# Patient Record
Sex: MALE | Race: ASIAN | NOT HISPANIC OR LATINO
[De-identification: names, ages, dates, MRNs, and addresses within clinical notes are randomized per-mention and may not be internally consistent; named-entity substitution may affect disease eponyms.]

---

## 2018-04-11 ENCOUNTER — APPOINTMENT (OUTPATIENT)
Dept: UROLOGY | Facility: CLINIC | Age: 68
End: 2018-04-11
Payer: MEDICARE

## 2018-04-11 VITALS
HEIGHT: 64 IN | DIASTOLIC BLOOD PRESSURE: 79 MMHG | BODY MASS INDEX: 21.34 KG/M2 | WEIGHT: 125 LBS | SYSTOLIC BLOOD PRESSURE: 124 MMHG | TEMPERATURE: 98.2 F | HEART RATE: 67 BPM | RESPIRATION RATE: 16 BRPM

## 2018-04-11 DIAGNOSIS — F41.9 ANXIETY DISORDER, UNSPECIFIED: ICD-10-CM

## 2018-04-11 DIAGNOSIS — N52.9 MALE ERECTILE DYSFUNCTION, UNSPECIFIED: ICD-10-CM

## 2018-04-11 DIAGNOSIS — C61 MALIGNANT NEOPLASM OF PROSTATE: ICD-10-CM

## 2018-04-11 DIAGNOSIS — N28.1 CYST OF KIDNEY, ACQUIRED: ICD-10-CM

## 2018-04-11 DIAGNOSIS — R31.29 OTHER MICROSCOPIC HEMATURIA: ICD-10-CM

## 2018-04-11 PROCEDURE — 99204 OFFICE O/P NEW MOD 45 MIN: CPT

## 2018-04-12 LAB
APPEARANCE: CLEAR
BACTERIA: NEGATIVE
BILIRUBIN URINE: NEGATIVE
BLOOD URINE: NEGATIVE
CALCIUM OXALATE CRYSTALS: ABNORMAL
COLOR: YELLOW
GLUCOSE QUALITATIVE U: NEGATIVE MG/DL
KETONES URINE: NEGATIVE
LEUKOCYTE ESTERASE URINE: NEGATIVE
MICROSCOPIC-UA: NORMAL
NITRITE URINE: NEGATIVE
PH URINE: 7.5
PROTEIN URINE: NEGATIVE MG/DL
RED BLOOD CELLS URINE: 2 /HPF
SPECIFIC GRAVITY URINE: 1.02
SQUAMOUS EPITHELIAL CELLS: 0 /HPF
UROBILINOGEN URINE: NEGATIVE MG/DL
WHITE BLOOD CELLS URINE: 0 /HPF

## 2018-04-15 LAB
BACTERIA UR CULT: NORMAL
CORE LAB FLUID CYTOLOGY: NORMAL

## 2018-12-04 ENCOUNTER — EMERGENCY (EMERGENCY)
Facility: HOSPITAL | Age: 68
LOS: 1 days | Discharge: HOME | End: 2018-12-04
Attending: EMERGENCY MEDICINE | Admitting: EMERGENCY MEDICINE
Payer: MEDICARE

## 2018-12-04 VITALS
OXYGEN SATURATION: 99 % | TEMPERATURE: 98 F | HEART RATE: 66 BPM | RESPIRATION RATE: 18 BRPM | SYSTOLIC BLOOD PRESSURE: 142 MMHG | DIASTOLIC BLOOD PRESSURE: 80 MMHG

## 2018-12-04 PROCEDURE — 73080 X-RAY EXAM OF ELBOW: CPT | Mod: 26,RT

## 2018-12-04 PROCEDURE — 73562 X-RAY EXAM OF KNEE 3: CPT | Mod: 26,LT

## 2018-12-04 PROCEDURE — 99284 EMERGENCY DEPT VISIT MOD MDM: CPT

## 2018-12-04 PROCEDURE — 70486 CT MAXILLOFACIAL W/O DYE: CPT | Mod: 26

## 2018-12-04 PROCEDURE — 72125 CT NECK SPINE W/O DYE: CPT | Mod: 26

## 2018-12-04 PROCEDURE — 71046 X-RAY EXAM CHEST 2 VIEWS: CPT | Mod: 26

## 2018-12-04 PROCEDURE — 70450 CT HEAD/BRAIN W/O DYE: CPT | Mod: 26

## 2018-12-04 RX ORDER — ACETAMINOPHEN 500 MG
975 TABLET ORAL ONCE
Qty: 0 | Refills: 0 | Status: COMPLETED | OUTPATIENT
Start: 2018-12-04 | End: 2018-12-04

## 2018-12-04 NOTE — ED PROVIDER NOTE - PHYSICAL EXAMINATION
On exam, HDS, NAD, AAOx3, PERRLA, CN 2-12 intact, 5/5 strength, SILT, head with mild TTP at R maxilla, no ecchymoses, cspine NTTP in midline, + FROM, rest of spine NTTP in midline, lungs CTAB, heart sounds normal, chest wall NTTP, abd benign, pelvis stable, ext without deformity, edema, or TTP, + FROM, 2+ pulses throughout, + abrasion to L hand and L knee

## 2018-12-04 NOTE — ED PROVIDER NOTE - OBJECTIVE STATEMENT
Cabot: 68M not on AC who tripped in the parking lot and fell onto his R face, R elbow, R chest, and L knee.  + LOC.  No N/V.  No numbness or weakness.  Last tdap less than 10 years ago.

## 2018-12-04 NOTE — ED ADULT TRIAGE NOTE - CHIEF COMPLAINT QUOTE
Patient tripped and fell in parking lot and hit his right cheek bone, right ribcage, right arm/elbow and left knee. Pt states it took his breath away and he almost felt like he lost consciousness for 20 seconds.

## 2018-12-04 NOTE — ED PROVIDER NOTE - MEDICAL DECISION MAKING DETAILS
Cabot: 68M not on AC who tripped in the parking lot and fell onto his R face, R elbow, R chest, and L knee.  + LOC.  No N/V.  No numbness or weakness.  Last tdap less than 10 years ago.  On exam, HDS, NAD, AAOx3, PERRLA, CN 2-12 intact, 5/5 strength, SILT, head with mild TTP at R maxilla, no ecchymoses, cspine NTTP in midline, + FROM, rest of spine NTTP in midline, lungs CTAB, heart sounds normal, chest wall NTTP, abd benign, pelvis stable, ext without deformity, edema, or TTP, + FROM, 2+ pulses throughout, + abrasion to L hand and L knee.  Will check CT head, CT cspine, CT max/face, CXR, XR R elbow, XR L knee, control pain.

## 2018-12-04 NOTE — ED PROVIDER NOTE - PMH
Depression    HTN (hypertension)    No pertinent past medical history    Prostate cancer  1997- prostatectomy

## 2018-12-04 NOTE — ED PROVIDER NOTE - NSFOLLOWUPINSTRUCTIONS_ED_ALL_ED_FT
You have been seen after a fall.  No fractures or bleeds were seen on your imaging.  This is likely contusions.  Please take tylenol and advil as needed for pain.  Come back to the ED if you develop vomiting, loss of consciousness, or other concerns.

## 2023-04-07 ENCOUNTER — OFFICE (OUTPATIENT)
Dept: URBAN - METROPOLITAN AREA CLINIC 90 | Facility: CLINIC | Age: 73
Setting detail: OPHTHALMOLOGY
End: 2023-04-07
Payer: MEDICARE

## 2023-04-07 DIAGNOSIS — H02.831: ICD-10-CM

## 2023-04-07 DIAGNOSIS — H25.13: ICD-10-CM

## 2023-04-07 DIAGNOSIS — H35.361: ICD-10-CM

## 2023-04-07 DIAGNOSIS — H35.372: ICD-10-CM

## 2023-04-07 DIAGNOSIS — H11.153: ICD-10-CM

## 2023-04-07 DIAGNOSIS — H02.834: ICD-10-CM

## 2023-04-07 PROCEDURE — 92014 COMPRE OPH EXAM EST PT 1/>: CPT | Performed by: OPHTHALMOLOGY

## 2023-04-07 PROCEDURE — 92134 CPTRZ OPH DX IMG PST SGM RTA: CPT | Performed by: OPHTHALMOLOGY

## 2023-04-07 ASSESSMENT — REFRACTION_CURRENTRX
OD_CYLINDER: -2.00
OD_CYLINDER: -1.75
OD_VPRISM_DIRECTION: PROGS
OS_OVR_VA: 20/
OD_VPRISM_DIRECTION: PROGS
OS_AXIS: 010
OD_ADD: +2.25
OS_VPRISM_DIRECTION: PROGS
OS_CYLINDER: -1.50
OS_OVR_VA: 20/
OS_SPHERE: -2.00
OS_ADD: +2.25
OS_VPRISM_DIRECTION: PROGS
OD_CYLINDER: +1.75
OS_CYLINDER: -1.25
OS_ADD: +2.75
OD_SPHERE: -3.25
OD_SPHERE: -0.75
OS_SPHERE: -2.00
OS_CYLINDER: +1.00
OD_AXIS: 172
OD_OVR_VA: 20/
OD_OVR_VA: 20/
OD_SPHERE: -1.50
OD_ADD: +2.75
OS_VPRISM_DIRECTION: PROGS
OS_ADD: +2.50
OD_VPRISM_DIRECTION: PROGS
OD_ADD: +2.50
OS_AXIS: 096
OS_OVR_VA: 20/
OD_AXIS: 081
OS_SPHERE: -3.00
OS_AXIS: 103
OD_OVR_VA: 20/
OD_AXIS: 070

## 2023-04-07 ASSESSMENT — REFRACTION_MANIFEST
OD_AXIS: 085
OD_VA1: 20/40
OD_AXIS: 175
OD_VA1: 20/30
OS_ADD: +3.50
OS_VA2: 20/30
OS_AXIS: 010
OD_ADD: +3.00
OS_VA1: 20/30+1
OS_AXIS: 100
OD_CYLINDER: +2.00
OS_SPHERE: -3.00
OS_ADD: +3.00
OS_VA1: 20/25-
OD_SPHERE: -3.50
OS_ADD: +3.00
OD_ADD: +3.50
OD_CYLINDER: -1.75
OS_VA1: 20/25-
OD_ADD: +3.00
OS_CYLINDER: +0.75
OD_VA1: 20/25
OS_CYLINDER: -1.25
OS_SPHERE: -2.25
OS_SPHERE: -2.25
OD_SPHERE: -1.75
OD_AXIS: 090
OU_VA: 20/20
OD_SPHERE: -1.75
OS_AXIS: 095
OS_CYLINDER: -1.25
OD_VA2: 20/30
OS_VA2: 20/25(J1)
OD_CYLINDER: -1.75
OD_VA2: 20/25(J1)

## 2023-04-07 ASSESSMENT — SPHEQUIV_DERIVED
OD_SPHEQUIV: -2.625
OS_SPHEQUIV: -2.625
OD_SPHEQUIV: -2.5
OD_SPHEQUIV: -3.125
OS_SPHEQUIV: -2.875
OD_SPHEQUIV: -2.625

## 2023-04-07 ASSESSMENT — TONOMETRY
OS_IOP_MMHG: 18
OD_IOP_MMHG: 18

## 2023-04-07 ASSESSMENT — KERATOMETRY
OD_K2POWER_DIOPTERS: 46.25
OS_AXISANGLE_DEGREES: 090
METHOD_AUTO_MANUAL: AUTO
OD_AXISANGLE_DEGREES: 172
OS_K1POWER_DIOPTERS: 45.50
OD_K1POWER_DIOPTERS: 5.75
OS_K2POWER_DIOPTERS: 45.50

## 2023-04-07 ASSESSMENT — AXIALLENGTH_DERIVED
OD_AL: 34.9
OD_AL: 34.48
OS_AL: 23.9838
OD_AL: 34.38
OS_AL: 23.9838
OS_AL: 23.8836
OS_AL: 23.9838
OD_AL: 34.48

## 2023-04-07 ASSESSMENT — VISUAL ACUITY
OD_BCVA: 20/30+1
OS_BCVA: 20/40-2

## 2023-04-07 ASSESSMENT — LID POSITION - DERMATOCHALASIS
OD_DERMATOCHALASIS: RUL 2+
OS_DERMATOCHALASIS: LUL 2+

## 2023-04-07 ASSESSMENT — REFRACTION_AUTOREFRACTION
OS_AXIS: 089
OD_SPHERE: -2.00
OS_SPHERE: -2.25
OD_AXIS: 089
OD_CYLINDER: -2.25
OS_CYLINDER: -1.25

## 2023-04-27 ENCOUNTER — OFFICE (OUTPATIENT)
Dept: URBAN - METROPOLITAN AREA CLINIC 90 | Facility: CLINIC | Age: 73
Setting detail: OPHTHALMOLOGY
End: 2023-04-27
Payer: MEDICARE

## 2023-04-27 DIAGNOSIS — T15.12XA: ICD-10-CM

## 2023-04-27 PROCEDURE — 65205 REMOVE FOREIGN BODY FROM EYE: CPT | Performed by: OPHTHALMOLOGY

## 2023-04-27 ASSESSMENT — REFRACTION_CURRENTRX
OD_ADD: +2.25
OS_CYLINDER: -1.50
OD_SPHERE: -1.50
OS_OVR_VA: 20/
OD_SPHERE: -3.25
OD_CYLINDER: -1.75
OS_ADD: +2.75
OS_VPRISM_DIRECTION: PROGS
OS_ADD: +2.50
OD_VPRISM_DIRECTION: PROGS
OD_ADD: +2.50
OD_VPRISM_DIRECTION: PROGS
OS_SPHERE: -3.00
OS_AXIS: 010
OS_VPRISM_DIRECTION: PROGS
OD_VPRISM_DIRECTION: PROGS
OD_AXIS: 081
OS_AXIS: 103
OD_OVR_VA: 20/
OD_ADD: +2.75
OD_CYLINDER: -2.00
OS_OVR_VA: 20/
OD_SPHERE: -0.75
OD_AXIS: 070
OD_CYLINDER: +1.75
OS_CYLINDER: -1.25
OS_ADD: +2.25
OS_SPHERE: -2.00
OD_OVR_VA: 20/
OS_SPHERE: -2.00
OS_VPRISM_DIRECTION: PROGS
OD_OVR_VA: 20/
OS_CYLINDER: +1.00
OS_AXIS: 096
OS_OVR_VA: 20/
OD_AXIS: 172

## 2023-04-27 ASSESSMENT — KERATOMETRY
OD_K2POWER_DIOPTERS: 46.25
OS_K1POWER_DIOPTERS: 45.50
METHOD_AUTO_MANUAL: AUTO
OS_AXISANGLE_DEGREES: 090
OD_AXISANGLE_DEGREES: 172
OD_K1POWER_DIOPTERS: 5.75
OS_K2POWER_DIOPTERS: 45.50

## 2023-04-27 ASSESSMENT — REFRACTION_MANIFEST
OS_AXIS: 095
OD_AXIS: 085
OD_ADD: +3.00
OD_CYLINDER: +2.00
OS_ADD: +3.00
OD_VA2: 20/25(J1)
OS_CYLINDER: -1.25
OD_VA1: 20/40
OD_VA1: 20/25
OD_VA1: 20/30
OS_SPHERE: -2.25
OD_AXIS: 090
OS_ADD: +3.50
OD_CYLINDER: -1.75
OD_SPHERE: -1.75
OD_SPHERE: -3.50
OS_VA2: 20/25(J1)
OS_VA1: 20/30+1
OD_CYLINDER: -1.75
OS_SPHERE: -2.25
OD_VA2: 20/30
OS_VA1: 20/25-
OU_VA: 20/20
OD_AXIS: 175
OD_ADD: +3.00
OS_VA2: 20/30
OS_ADD: +3.00
OS_VA1: 20/25-
OS_CYLINDER: -1.25
OS_AXIS: 100
OD_ADD: +3.50
OS_CYLINDER: +0.75
OS_AXIS: 010
OD_SPHERE: -1.75
OS_SPHERE: -3.00

## 2023-04-27 ASSESSMENT — VISUAL ACUITY
OD_BCVA: 20/30
OS_BCVA: 20/40-2

## 2023-04-27 ASSESSMENT — REFRACTION_AUTOREFRACTION
OD_SPHERE: -2.00
OS_CYLINDER: -1.25
OD_CYLINDER: -2.25
OD_AXIS: 089
OS_AXIS: 089
OS_SPHERE: -2.25

## 2023-04-27 ASSESSMENT — SPHEQUIV_DERIVED
OS_SPHEQUIV: -2.875
OS_SPHEQUIV: -2.875
OD_SPHEQUIV: -2.625
OS_SPHEQUIV: -2.875
OS_SPHEQUIV: -2.625
OD_SPHEQUIV: -2.5
OD_SPHEQUIV: -3.125
OD_SPHEQUIV: -2.625

## 2023-04-27 ASSESSMENT — AXIALLENGTH_DERIVED
OD_AL: 34.48
OD_AL: 34.48
OS_AL: 23.9838
OD_AL: 34.38
OS_AL: 23.9838
OS_AL: 23.8836
OD_AL: 34.9
OS_AL: 23.9838

## 2023-04-28 PROBLEM — T15.12XA FOREIGN BODY, CONJUNCTIVAL; LEFT EYE INITIAL ENCOUNTER: Status: ACTIVE | Noted: 2023-04-27

## 2023-04-29 ENCOUNTER — INPATIENT (INPATIENT)
Facility: HOSPITAL | Age: 73
LOS: 1 days | Discharge: HOME | End: 2023-05-01
Attending: INTERNAL MEDICINE | Admitting: INTERNAL MEDICINE
Payer: MEDICARE

## 2023-04-29 VITALS
OXYGEN SATURATION: 100 % | RESPIRATION RATE: 18 BRPM | HEART RATE: 78 BPM | SYSTOLIC BLOOD PRESSURE: 152 MMHG | DIASTOLIC BLOOD PRESSURE: 81 MMHG | TEMPERATURE: 98 F

## 2023-04-29 DIAGNOSIS — E87.1 HYPO-OSMOLALITY AND HYPONATREMIA: ICD-10-CM

## 2023-04-29 DIAGNOSIS — Z29.9 ENCOUNTER FOR PROPHYLACTIC MEASURES, UNSPECIFIED: ICD-10-CM

## 2023-04-29 DIAGNOSIS — R07.9 CHEST PAIN, UNSPECIFIED: ICD-10-CM

## 2023-04-29 DIAGNOSIS — I20.9 ANGINA PECTORIS, UNSPECIFIED: ICD-10-CM

## 2023-04-29 DIAGNOSIS — F32.89 OTHER SPECIFIED DEPRESSIVE EPISODES: ICD-10-CM

## 2023-04-29 LAB
ALBUMIN SERPL ELPH-MCNC: 4.3 G/DL — SIGNIFICANT CHANGE UP (ref 3.3–5)
ALP SERPL-CCNC: 67 U/L — SIGNIFICANT CHANGE UP (ref 40–120)
ALT FLD-CCNC: 17 U/L — SIGNIFICANT CHANGE UP (ref 4–41)
ANION GAP SERPL CALC-SCNC: 13 MMOL/L — SIGNIFICANT CHANGE UP (ref 7–14)
AST SERPL-CCNC: 17 U/L — SIGNIFICANT CHANGE UP (ref 4–40)
BASOPHILS # BLD AUTO: 0.05 K/UL — SIGNIFICANT CHANGE UP (ref 0–0.2)
BASOPHILS NFR BLD AUTO: 1.1 % — SIGNIFICANT CHANGE UP (ref 0–2)
BILIRUB SERPL-MCNC: 0.3 MG/DL — SIGNIFICANT CHANGE UP (ref 0.2–1.2)
BUN SERPL-MCNC: 12 MG/DL — SIGNIFICANT CHANGE UP (ref 7–23)
CALCIUM SERPL-MCNC: 8.8 MG/DL — SIGNIFICANT CHANGE UP (ref 8.4–10.5)
CHLORIDE SERPL-SCNC: 90 MMOL/L — LOW (ref 98–107)
CO2 SERPL-SCNC: 22 MMOL/L — SIGNIFICANT CHANGE UP (ref 22–31)
CREAT SERPL-MCNC: 0.81 MG/DL — SIGNIFICANT CHANGE UP (ref 0.5–1.3)
EGFR: 93 ML/MIN/1.73M2 — SIGNIFICANT CHANGE UP
EOSINOPHIL # BLD AUTO: 0.08 K/UL — SIGNIFICANT CHANGE UP (ref 0–0.5)
EOSINOPHIL NFR BLD AUTO: 1.7 % — SIGNIFICANT CHANGE UP (ref 0–6)
FLUAV AG NPH QL: SIGNIFICANT CHANGE UP
FLUBV AG NPH QL: SIGNIFICANT CHANGE UP
GLUCOSE BLDC GLUCOMTR-MCNC: 106 MG/DL — HIGH (ref 70–99)
GLUCOSE SERPL-MCNC: 105 MG/DL — HIGH (ref 70–99)
HCT VFR BLD CALC: 40.4 % — SIGNIFICANT CHANGE UP (ref 39–50)
HGB BLD-MCNC: 14 G/DL — SIGNIFICANT CHANGE UP (ref 13–17)
IANC: 2.98 K/UL — SIGNIFICANT CHANGE UP (ref 1.8–7.4)
IMM GRANULOCYTES NFR BLD AUTO: 0.2 % — SIGNIFICANT CHANGE UP (ref 0–0.9)
LYMPHOCYTES # BLD AUTO: 0.98 K/UL — LOW (ref 1–3.3)
LYMPHOCYTES # BLD AUTO: 21.4 % — SIGNIFICANT CHANGE UP (ref 13–44)
MCHC RBC-ENTMCNC: 30.7 PG — SIGNIFICANT CHANGE UP (ref 27–34)
MCHC RBC-ENTMCNC: 34.7 GM/DL — SIGNIFICANT CHANGE UP (ref 32–36)
MCV RBC AUTO: 88.6 FL — SIGNIFICANT CHANGE UP (ref 80–100)
MONOCYTES # BLD AUTO: 0.48 K/UL — SIGNIFICANT CHANGE UP (ref 0–0.9)
MONOCYTES NFR BLD AUTO: 10.5 % — SIGNIFICANT CHANGE UP (ref 2–14)
NEUTROPHILS # BLD AUTO: 2.98 K/UL — SIGNIFICANT CHANGE UP (ref 1.8–7.4)
NEUTROPHILS NFR BLD AUTO: 65.1 % — SIGNIFICANT CHANGE UP (ref 43–77)
NRBC # BLD: 0 /100 WBCS — SIGNIFICANT CHANGE UP (ref 0–0)
NRBC # FLD: 0 K/UL — SIGNIFICANT CHANGE UP (ref 0–0)
NT-PROBNP SERPL-SCNC: 29 PG/ML — SIGNIFICANT CHANGE UP
PLATELET # BLD AUTO: 207 K/UL — SIGNIFICANT CHANGE UP (ref 150–400)
POTASSIUM SERPL-MCNC: 4.3 MMOL/L — SIGNIFICANT CHANGE UP (ref 3.5–5.3)
POTASSIUM SERPL-SCNC: 4.3 MMOL/L — SIGNIFICANT CHANGE UP (ref 3.5–5.3)
PROT SERPL-MCNC: 6.3 G/DL — SIGNIFICANT CHANGE UP (ref 6–8.3)
RBC # BLD: 4.56 M/UL — SIGNIFICANT CHANGE UP (ref 4.2–5.8)
RBC # FLD: 11.2 % — SIGNIFICANT CHANGE UP (ref 10.3–14.5)
RSV RNA NPH QL NAA+NON-PROBE: SIGNIFICANT CHANGE UP
SARS-COV-2 RNA SPEC QL NAA+PROBE: SIGNIFICANT CHANGE UP
SODIUM SERPL-SCNC: 125 MMOL/L — LOW (ref 135–145)
TROPONIN T, HIGH SENSITIVITY RESULT: <6 NG/L — SIGNIFICANT CHANGE UP
TROPONIN T, HIGH SENSITIVITY RESULT: <6 NG/L — SIGNIFICANT CHANGE UP
WBC # BLD: 4.58 K/UL — SIGNIFICANT CHANGE UP (ref 3.8–10.5)
WBC # FLD AUTO: 4.58 K/UL — SIGNIFICANT CHANGE UP (ref 3.8–10.5)

## 2023-04-29 PROCEDURE — 99285 EMERGENCY DEPT VISIT HI MDM: CPT

## 2023-04-29 PROCEDURE — 99223 1ST HOSP IP/OBS HIGH 75: CPT

## 2023-04-29 PROCEDURE — 71045 X-RAY EXAM CHEST 1 VIEW: CPT | Mod: 26

## 2023-04-29 RX ORDER — POLYETHYLENE GLYCOL 3350 17 G/17G
17 POWDER, FOR SOLUTION ORAL DAILY
Refills: 0 | Status: DISCONTINUED | OUTPATIENT
Start: 2023-04-29 | End: 2023-05-01

## 2023-04-29 RX ORDER — CITALOPRAM 10 MG/1
10 TABLET, FILM COATED ORAL DAILY
Refills: 0 | Status: DISCONTINUED | OUTPATIENT
Start: 2023-04-29 | End: 2023-04-29

## 2023-04-29 RX ORDER — ONDANSETRON 8 MG/1
4 TABLET, FILM COATED ORAL EVERY 8 HOURS
Refills: 0 | Status: DISCONTINUED | OUTPATIENT
Start: 2023-04-29 | End: 2023-05-01

## 2023-04-29 RX ORDER — LANOLIN ALCOHOL/MO/W.PET/CERES
3 CREAM (GRAM) TOPICAL AT BEDTIME
Refills: 0 | Status: DISCONTINUED | OUTPATIENT
Start: 2023-04-29 | End: 2023-05-01

## 2023-04-29 RX ORDER — ASPIRIN/CALCIUM CARB/MAGNESIUM 324 MG
324 TABLET ORAL ONCE
Refills: 0 | Status: COMPLETED | OUTPATIENT
Start: 2023-04-29 | End: 2023-04-29

## 2023-04-29 RX ORDER — ENOXAPARIN SODIUM 100 MG/ML
40 INJECTION SUBCUTANEOUS EVERY 24 HOURS
Refills: 0 | Status: DISCONTINUED | OUTPATIENT
Start: 2023-04-29 | End: 2023-05-01

## 2023-04-29 RX ORDER — MIRTAZAPINE 45 MG/1
1 TABLET, ORALLY DISINTEGRATING ORAL
Refills: 0 | DISCHARGE

## 2023-04-29 RX ORDER — MIRTAZAPINE 45 MG/1
7.5 TABLET, ORALLY DISINTEGRATING ORAL AT BEDTIME
Refills: 0 | Status: DISCONTINUED | OUTPATIENT
Start: 2023-04-29 | End: 2023-05-01

## 2023-04-29 RX ORDER — ACETAMINOPHEN 500 MG
650 TABLET ORAL EVERY 6 HOURS
Refills: 0 | Status: DISCONTINUED | OUTPATIENT
Start: 2023-04-29 | End: 2023-05-01

## 2023-04-29 RX ORDER — POLYETHYLENE GLYCOL 3350 17 G/17G
17 POWDER, FOR SOLUTION ORAL
Refills: 0 | DISCHARGE

## 2023-04-29 RX ADMIN — Medication 324 MILLIGRAM(S): at 17:47

## 2023-04-29 NOTE — H&P ADULT - TIME BILLING
Reviewed admission imaging reports, and admission labs prior to the encounter with  the patient   Reviewed Triage and ED course/ documentation   Performed full physical exam and ROS  Reviewed outpatient records on Allsctripts as above  Obtained list of home medications and performed home medications reconciliation on EMR  Discussed prognosis, treatment and work/up with the patient   Ordered or reviewed new admission medications and placed or reviewed all hospitalization admission orders  Managed (continued, held or adjusted doses) of home medications   Ordered  or reviewed orders of  new imaging and new lab w/up  Requested new consultations including::: Cardiology

## 2023-04-29 NOTE — ED PROVIDER NOTE - PROGRESS NOTE DETAILS
kusum VILLATORO PGY-1: Signout received.  72-year-old male history of hypertension hyperlipidemia have not seen cardiologist in 10 years complaining of chest pain when swimming, patiently actively with chest pain.  Trope below 6.  Some T wave inversions on EKG.  Telemetry doc paged twice, most recent 7:30 PM

## 2023-04-29 NOTE — PATIENT PROFILE ADULT - FALL HARM RISK - HARM RISK INTERVENTIONS

## 2023-04-29 NOTE — H&P ADULT - NSHPPHYSICALEXAM_GEN_ALL_CORE
Vital Signs Last 24 Hrs  T(C): 36.7 (29 Apr 2023 20:56), Max: 36.8 (29 Apr 2023 16:41)  T(F): 98 (29 Apr 2023 20:56), Max: 98.2 (29 Apr 2023 16:41)  HR: 58 (29 Apr 2023 20:56) (58 - 78)  BP: 137/72 (29 Apr 2023 20:56) (137/72 - 152/81)  BP(mean): --  RR: 20 (29 Apr 2023 20:56) (18 - 20)  SpO2: 100% (29 Apr 2023 20:56) (100% - 100%)    Parameters below as of 29 Apr 2023 20:56  Patient On (Oxygen Delivery Method): room air

## 2023-04-29 NOTE — ED PROVIDER NOTE - OBJECTIVE STATEMENT
Jessi VILLATORO: 72-year-old male, history of depression, hyperlipidemia, presents with a chief complaint of sudden onset substernal nonradiating pressure-like chest pain currently 7 out of 10 that started today while at rest, patient reports he swam today and did not develop any discomfort or shortness of breath thereafter, however yesterday when he sweeps when he developed the same discomfort.  No back pain, no fever, no nausea no vomiting no abdominal pain, no new lower extremity swelling or edema, patient reports he does not have a cardiologist, last had cardiac testing years ago.  Non-smoker.  Patient reports he recently had dental work but is not having any fever.  No meds prior to ED arrival.  Pain did not start today after eating.

## 2023-04-29 NOTE — ED PROVIDER NOTE - PHYSICAL EXAMINATION
Jessi VILLATORO:  VITALS: Initial triage and subsequent vitals have been reviewed by me.  GEN APPEARANCE: Alert, cooperative. Non-toxic appearing. Well appearing. NAD.  HEAD: Atraumatic, normocephalic   EYES: PERRLa, EOMI, vision grossly intact.   EARS: Gross hearing intact.   NOSE: No nasal discharge, no external evidence of epistaxis.   NECK: Supple  CV: RRR, S1S2, no c/r/m/g. No cyanosis. Extremities warm, well perfused. Cap refill <2 seconds. No bruits.   LUNGS: CTAB. No wheezing. No rales. No rhonchi. No diminished breath sounds.   ABDOMEN: Soft, NTND. No guarding or rebound. No masses.   MSK/EXT: Spine appears normal, no spine point tenderness. No CVA ttp. Normal muscular development. Pelvis stable. No obvious joint or bony deformity, no peripheral edema.   NEURO: Alert, follows commands. Weight bearing normal. Speech normal. Sensation and motor normal x4 extremities.   SKIN: Normal color for race, warm, dry and intact. No evidence of rash.  PSYCH: Normal mood and affect.

## 2023-04-29 NOTE — ED ADULT NURSE NOTE - CHIEF COMPLAINT QUOTE
Pt with co chest pain since 12 noon with no co sob. pt co headache x 2 days. Pt denies fever, chills or cough.

## 2023-04-29 NOTE — ED PROVIDER NOTE - CLINICAL SUMMARY MEDICAL DECISION MAKING FREE TEXT BOX
Jessi VILLATORO: Exam vital signs stable nontoxic-appearing physical exam as above, DDx concern for possible unstable angina/ACS, less concern for dissection at this time there is no back pain patient is well-appearing hemodynamically stable, can move everything and for everything, less concern for PE as no lower extremity swelling no cough, no prior history of DVT or PE, EKG with possible new T wave inversions in V3 and V4 compared to prior, plan to check basic labs EKG cardiac enzymes chest x-ray, will give aspirin, anticipate admission to telemetry doc for further evaluation.

## 2023-04-29 NOTE — H&P ADULT - HISTORY OF PRESENT ILLNESS
73yo male, non-smoker, history of depression, hyperlipidemia; c/o sudden onset substernal nonradiating pressure-like chest pain, 7 out of 10, that started today while at rest. Reports he swam today and did not develop any discomfort or shortness of breath thereafter, however yesterday when he was sweeping developed the same discomfort.  Otherwise no back pain, fever, nausea, vomiting, abdominal pain, new lower extremity swelling or edema. States that does not have a cardiologist, and the last cardiac testing was "years" ago. Recently had dental work but not reporting fever. Did not take medications today.  71yo male, non-smoker, history of depression, hyperlipidemia; c/o sudden onset substernal nonradiating pressure-like chest pain, 7 out of 10, that started today while at rest. Reports he swam today and did not develop any discomfort or shortness of breath thereafter, however yesterday when he was also swimming he did develop the same chest discomfort. Otherwise no back pain, fever, nausea, vomiting, abdominal pain, new lower extremity swelling or edema. States that does not have a cardiologist, and the last cardiac testing was "years" ago. Recently had dental work but not reporting fever. Did not take medications today.

## 2023-04-29 NOTE — ED ADULT NURSE NOTE - OBJECTIVE STATEMENT
Received patient in room 14 c/o chest pain started today, patient denies SOB, fever. Patient is on cardiac monitor sinus rhythm w/O2 sat 100% on a room air. Patient is A&OX4, airway patent, breathing unlabored and even, radial pulses palpable. Labs obtained, 20G IV placed on right arm, medication given as ordered. Side rails up and safety maintained. Fall precaution in place. Call bells within reach.

## 2023-04-29 NOTE — H&P ADULT - PROBLEM SELECTOR PLAN 2
Sodium 125; No pain or nausea; Possible side-effect of SSRI:  Exam not c/w fluid overload, Pro-BNP wnl   -TSH  -Osmolality urine and serum ordered   -CXR: clear lungs   -Hold Celexa   -Monitor sodium daily  -Hold all iv fluids

## 2023-04-29 NOTE — ED ADULT NURSE NOTE - NSHOSCREENINGQ1_ED_ALL_ED
Problem: Discharge Planning:  Goal: Ability to perform activities of daily living will improve  Description  Ability to perform activities of daily living will improve  12/12/2019 0254 by Verna Miller RN  Outcome: Met This Shift     Problem: Discharge Planning:  Goal: Participates in care planning  Description  Participates in care planning  12/12/2019 0254 by Verna Miller RN  Outcome: Met This Shift     Problem: Risk for Impaired Skin Integrity  Goal: Tissue integrity - skin and mucous membranes  Description  Structural intactness and normal physiological function of skin and  mucous membranes.   12/12/2019 0254 by Verna Miller RN  Outcome: Met This Shift     Problem: Pain:  Goal: Pain level will decrease  Description  Pain level will decrease  12/12/2019 0254 by Verna Miller RN  Outcome: Met This Shift     Problem: Pain:  Goal: Control of acute pain  Description  Control of acute pain  12/12/2019 0254 by Verna Miller RN  Outcome: Met This Shift     Problem: Pain:  Goal: Control of chronic pain  Description  Control of chronic pain  12/12/2019 0254 by Verna Miller RN  Outcome: Met This Shift     Problem: Breathing Pattern - Ineffective:  Goal: Ability to achieve and maintain a regular respiratory rate will improve  Description  Ability to achieve and maintain a regular respiratory rate will improve  12/12/2019 0254 by Verna Miller RN  Outcome: Met This Shift     Problem: Gas Exchange - Impaired:  Goal: Levels of oxygenation will improve  Description  Levels of oxygenation will improve  12/12/2019 0254 by Verna Miller RN  Outcome: Met This Shift  Note:   Pt is tolerating being weaned from O2 well No

## 2023-04-29 NOTE — H&P ADULT - PROBLEM SELECTOR PLAN 1
Exertional and non-exertional self-resolving non-reproducible CP;   MARGARITA score =2, 8% risk   HEART score =5, moderate   -Telemetry   -EKG: biphasic Tw in anterior leads   -2D echo ordered  -Formal ischemic w/up deferred to cardiology, Dr. Lyles, who will take the care over in AM   -TSH, Lipid panel, a1c

## 2023-04-29 NOTE — ED PROVIDER NOTE - NSICDXPASTMEDICALHX_GEN_ALL_CORE_FT
PAST MEDICAL HISTORY:  Depression     HTN (hypertension)     Prostate cancer 1997- prostatectomy

## 2023-04-29 NOTE — H&P ADULT - NSHPLABSRESULTS_GEN_ALL_CORE
.    -Personally interpreted EKG:      -Personally interpreted CXR: clear lungs, no pneumothorax, no pleural effusions      -Personally interpreted labs below:                        14.0   4.58  )-----------( 207      ( 29 Apr 2023 17:45 )             40.4   04-29    125<L>  |  90<L>  |  12  ----------------------------<  105<H>  4.3   |  22  |  0.81    Ca    8.8      29 Apr 2023 17:45    TPro  6.3  /  Alb  4.3  /  TBili  0.3  /  DBili  x   /  AST  17  /  ALT  17  /  AlkPhos  67  04-29      SARS-CoV-2 Result: Giuseppete (04-29-23 @ 17:46)  Influenza A Result: NotDete (04-29-23 @ 17:46)  Influenza B Result: Giuseppetec (04-29-23 @ 17:46)  Resp Syn Virus Result: NotDetec (04-29-23 @ 17:46) .    -Personally interpreted EKG: nsr, 67bpm, qtc 429, biphasic Tw in V2-V4, no acute ST changes, no pacs or pvcs       -Personally interpreted CXR: clear lungs, no pneumothorax, no pleural effusions      -Personally interpreted labs below:                        14.0   4.58  )-----------( 207      ( 29 Apr 2023 17:45 )             40.4   04-29    125<L>  |  90<L>  |  12  ----------------------------<  105<H>  4.3   |  22  |  0.81    Ca    8.8      29 Apr 2023 17:45    TPro  6.3  /  Alb  4.3  /  TBili  0.3  /  DBili  x   /  AST  17  /  ALT  17  /  AlkPhos  67  04-29      SARS-CoV-2 Result: NotDetec (04-29-23 @ 17:46)  Influenza A Result: NotDetec (04-29-23 @ 17:46)  Influenza B Result: NotDetec (04-29-23 @ 17:46)  Resp Syn Virus Result: NotDetec (04-29-23 @ 17:46)

## 2023-04-30 ENCOUNTER — TRANSCRIPTION ENCOUNTER (OUTPATIENT)
Age: 73
End: 2023-04-30

## 2023-04-30 LAB
A1C WITH ESTIMATED AVERAGE GLUCOSE RESULT: 5.5 % — SIGNIFICANT CHANGE UP (ref 4–5.6)
BUN SERPL-MCNC: 9 MG/DL — SIGNIFICANT CHANGE UP (ref 7–23)
CALCIUM SERPL-MCNC: 8.7 MG/DL — SIGNIFICANT CHANGE UP (ref 8.4–10.5)
CHLORIDE UR-SCNC: 31 MMOL/L — SIGNIFICANT CHANGE UP
CHOLEST SERPL-MCNC: 213 MG/DL — HIGH
CO2 SERPL-SCNC: 23 MMOL/L — SIGNIFICANT CHANGE UP (ref 22–31)
ESTIMATED AVERAGE GLUCOSE: 111 — SIGNIFICANT CHANGE UP
HCV AB S/CO SERPL IA: 0.1 S/CO — SIGNIFICANT CHANGE UP (ref 0–0.99)
HCV AB SERPL-IMP: SIGNIFICANT CHANGE UP
HDLC SERPL-MCNC: 92 MG/DL — SIGNIFICANT CHANGE UP
LIPID PNL WITH DIRECT LDL SERPL: 108 MG/DL — HIGH
MAGNESIUM SERPL-MCNC: 2.1 MG/DL — SIGNIFICANT CHANGE UP (ref 1.6–2.6)
NON HDL CHOLESTEROL: 121 MG/DL — SIGNIFICANT CHANGE UP
OSMOLALITY SERPL: 262 MOSM/KG — LOW (ref 275–295)
OSMOLALITY UR: 466 MOSM/KG — SIGNIFICANT CHANGE UP (ref 50–1200)
PHOSPHATE SERPL-MCNC: 4.9 MG/DL — HIGH (ref 2.5–4.5)
POTASSIUM UR-SCNC: 36.9 MMOL/L — SIGNIFICANT CHANGE UP
SODIUM UR-SCNC: 42 MMOL/L — SIGNIFICANT CHANGE UP
TRIGL SERPL-MCNC: 63 MG/DL — SIGNIFICANT CHANGE UP
TSH SERPL-MCNC: 3.54 UIU/ML — SIGNIFICANT CHANGE UP (ref 0.27–4.2)

## 2023-04-30 RX ORDER — ATORVASTATIN CALCIUM 80 MG/1
20 TABLET, FILM COATED ORAL AT BEDTIME
Refills: 0 | Status: DISCONTINUED | OUTPATIENT
Start: 2023-04-30 | End: 2023-05-01

## 2023-04-30 RX ORDER — SODIUM CHLORIDE 9 MG/ML
1 INJECTION INTRAMUSCULAR; INTRAVENOUS; SUBCUTANEOUS
Refills: 0 | Status: DISCONTINUED | OUTPATIENT
Start: 2023-04-30 | End: 2023-05-01

## 2023-04-30 RX ADMIN — SODIUM CHLORIDE 1 GRAM(S): 9 INJECTION INTRAMUSCULAR; INTRAVENOUS; SUBCUTANEOUS at 17:26

## 2023-04-30 RX ADMIN — POLYETHYLENE GLYCOL 3350 17 GRAM(S): 17 POWDER, FOR SOLUTION ORAL at 11:18

## 2023-04-30 RX ADMIN — MIRTAZAPINE 7.5 MILLIGRAM(S): 45 TABLET, ORALLY DISINTEGRATING ORAL at 21:37

## 2023-04-30 RX ADMIN — ATORVASTATIN CALCIUM 20 MILLIGRAM(S): 80 TABLET, FILM COATED ORAL at 21:37

## 2023-04-30 RX ADMIN — SODIUM CHLORIDE 1 GRAM(S): 9 INJECTION INTRAMUSCULAR; INTRAVENOUS; SUBCUTANEOUS at 11:16

## 2023-04-30 NOTE — CONSULT NOTE ADULT - ASSESSMENT
EKG SR no ischemic changes     Assessment and plan     1) CP : EKG ok trop -enrico , will get echo and stress    2) SOB: Euvolemic on exam , will get echo     3) DVT PPX lovenox 
73yo male, non-smoker, history of depression, hyperlipidemia a/w hyponatremia and angina;       Hyponatremia Euvolemic  Sodium 125  Please get TSH and cortisol level.   Etiology is secondary to SIADH and or Celexa  CXR: clear lungs   On Sodium chloride tabletes  for now  Fluid restrict to one liter.   Hold Celexa   Get BMP now and in am.     HTN  Optimal  Continue to monitor.

## 2023-04-30 NOTE — CONSULT NOTE ADULT - SUBJECTIVE AND OBJECTIVE BOX
[facility]   MD JOVANNI BRASWELL MD KRISTINE SOLTANPOUR, DO ANGELA WONG, PA        TEL:  OFFICE: [phone]  From 5pm-7am answering service [phone]    --- INITIAL RENAL CONSULT NOTE ---date of service 04-30-23 @ 22:15    HPI:  71yo male, non-smoker, history of depression, hyperlipidemia; c/o sudden onset substernal nonradiating pressure-like chest pain, 7 out of 10, that started today while at rest. Reports he swam today and did not develop any discomfort or shortness of breath thereafter, however yesterday when he was also swimming he did develop the same chest discomfort. Otherwise no back pain, fever, nausea, vomiting, abdominal pain, new lower extremity swelling or edema. States that does not have a cardiologist, and the last cardiac testing was "years" ago. Recently had dental work but not reporting fever. Nephrology consulted for hyponatremia.         Allergies:  No Known Drug Allergies  iv contrast (Hives)      PAST MEDICAL & SURGICAL HISTORY:  HTN (hypertension)  Depression  Prostate cancer  1997- prostatectomy  S/P prostatectomy  1997        Home Medications:  citalopram 10 mg oral tablet: 1 orally once a day (29 Apr 2023 23:17)  MiraLax oral powder for reconstitution: 17 orally once a day (29 Apr 2023 23:17)  mirtazapine 7.5 mg oral tablet: 1 orally once a day (at bedtime) (29 Apr 2023 23:16)    Home Medications Reviewed    Hospital Medications:   MEDICATIONS  (STANDING):  atorvastatin 20 milliGRAM(s) Oral at bedtime  enoxaparin Injectable 40 milliGRAM(s) SubCutaneous every 24 hours  mirtazapine 7.5 milliGRAM(s) Oral at bedtime  polyethylene glycol 3350 17 Gram(s) Oral daily  sodium chloride 1 Gram(s) Oral two times a day      SOCIAL HISTORY:  Denies ETOh, Smoking,     FAMILY HISTORY:  FH: lung cancer (Mother)        REVIEW OF SYSTEMS:  CONSTITUTIONAL: No weakness, fevers or chills  EYES/ENT: No visual changes;  No vertigo or throat pain   NECK: No pain or stiffness  RESPIRATORY: No cough, wheezing, hemoptysis; No shortness of breath  CARDIOVASCULAR: No chest pain or palpitations.  GASTROINTESTINAL: No abdominal or epigastric pain. No nausea, vomiting, or hematemesis; No diarrhea or constipation. No melena or hematochezia.  GENITOURINARY: No dysuria, frequency, foamy urine, urinary urgency, incontinence or hematuria  NEUROLOGICAL: No numbness or weakness  SKIN: No itching, burning, rashes, or lesions   VASCULAR: No bilateral lower extremity edema.   All other review of systems is negative unless indicated above.    VITALS:  T(F): 98 (04-30-23 @ 22:03), Max: 98.2 (04-29-23 @ 22:40)  HR: 62 (04-30-23 @ 22:03)  BP: 138/81 (04-30-23 @ 22:03)  RR: 17 (04-30-23 @ 22:03)  SpO2: 98% (04-30-23 @ 22:03)  Wt(kg): --    04-30 @ 07:01  -  04-30 @ 22:15  --------------------------------------------------------  IN: 0 mL / OUT: 150 mL / NET: -150 mL      Height (cm): 162.6 (04-29 @ 22:40)  Weight (kg): 54.9 (04-29 @ 22:40)  BMI (kg/m2): 20.8 (04-29 @ 22:40)  BSA (m2): 1.58 (04-29 @ 22:40)    PHYSICAL EXAM:  General: NAD  HEENT: anicteric sclera, oropharynx clear, MMM  Neck: No JVD  Respiratory: CTAB, no wheezes, rales or rhonchi  Cardiovascular: S1, S2, RRR  Gastrointestinal: BS+, soft, NT/ND  Extremities: No cyanosis or clubbing. No peripheral edema  Neurological: A/O x 3, no focal deficits  Psychiatric: Normal mood, normal affect  : No CVA tenderness. No tran.   Skin: No rashes      LABS:  04-29    125<L>  |  90<L>  |  12  ----------------------------<  105<H>  4.3   |  22  |  0.81    Ca    8.8      29 Apr 2023 17:45    TPro  6.3  /  Alb  4.3  /  TBili  0.3  /  DBili      /  AST  17  /  ALT  17  /  AlkPhos  67  04-29    Creatinine Trend: 0.81 <--                        14.0   4.58  )-----------( 207      ( 29 Apr 2023 17:45 )             40.4     Urine Studies:    Osmolality, Random Urine: 466 mosm/kg (04-30 @ 06:00)  Sodium, Random Urine: 42 mmol/L (04-30 @ 06:00)  Potassium, Random Urine: 36.9 mmol/L (04-30 @ 06:00)  Chloride, Random Urine: 31 mmol/L (04-30 @ 06:00)      RADIOLOGY & ADDITIONAL STUDIES:    ACC: [id number] EXAM:  XR CHEST PORTABLE URGENT 1V   ORDERED BY: JUAN GUTIERREZ     PROCEDURE DATE:  04/29/2023          INTERPRETATION:  EXAMINATION: XR CHEST URGENT    CLINICAL INDICATION: Chest Pain    TECHNIQUE: Single frontal, portable view of the chest was obtained.    COMPARISON: Chest x-ray 12/4/2018    FINDINGS:  The heart is normal in size.  The lungs are clear.  There is no pneumothorax or pleural effusion.    IMPRESSION:  Clear lungs.    --- End of Report ---          CODI JAIN MD; Resident Radiologist  This document has been electronically signed.  CHRISTA WEBB MD; Attending Radiologist  This document has been electronically signed. Apr 30 2023  8:54AM            
Stanislav Lyles MD  Interventional Cardiology / Endovascular Specialist  [place] Office : [phone] [place] [address] [place] [address]  Tel:   [place] Office : [phone] [facility] [place] [address]  Tel: [phone]    H&P:  73yo male, non-smoker, history of depression, hyperlipidemia; c/o sudden onset substernal nonradiating pressure-like chest pain, 7 out of 10, that started today while at rest. Reports he swam today and did not develop any discomfort or shortness of breath thereafter, however yesterday when he was also swimming he did develop the same chest discomfort. Otherwise no back pain, fever, nausea, vomiting, abdominal pain, new lower extremity swelling or edema. States that does not have a cardiologist, and the last cardiac testing was "years" ago. Recently had dental work but not reporting fever. Did not take medications today.      PAST MEDICAL & SURGICAL HISTORY:  HTN (hypertension)      Depression      Prostate cancer  1997- prostatectomy      S/P prostatectomy  1997          SOCIAL HISTORY: Substance Use (street drugs): ( x ) never used  (  ) other:    FAMILY HISTORY:  FH: lung cancer (Mother)        REVIEW OF SYSTEMS:  CONSTITUTIONAL: No fever, weight loss, or fatigue  EYES: No eye pain, visual disturbances, or discharge  ENMT:  No difficulty hearing, tinnitus, vertigo; No sinus or throat pain  BREASTS: No pain, masses, or nipple discharge  GASTROINTESTINAL: No abdominal or epigastric pain. No nausea, vomiting, or hematemesis; No diarrhea or constipation. No melena or hematochezia.  GENITOURINARY: No dysuria, frequency, hematuria, or incontinence  NEUROLOGICAL: No headaches, memory loss, loss of strength, numbness, or tremors  ENDOCRINE: No heat or cold intolerance; No hair loss  MUSCULOSKELETAL: No joint pain or swelling; No muscle, back, or extremity pain  PSYCHIATRIC: No depression, anxiety, mood swings, or difficulty sleeping  HEME/LYMPH: No easy bruising, or bleeding gums  All others negative    MEDICATIONS:  enoxaparin Injectable 40 milliGRAM(s) SubCutaneous every 24 hours        acetaminophen     Tablet .. 650 milliGRAM(s) Oral every 6 hours PRN  melatonin 3 milliGRAM(s) Oral at bedtime PRN  mirtazapine 7.5 milliGRAM(s) Oral at bedtime  ondansetron Injectable 4 milliGRAM(s) IV Push every 8 hours PRN    polyethylene glycol 3350 17 Gram(s) Oral daily    atorvastatin 20 milliGRAM(s) Oral at bedtime    sodium chloride 1 Gram(s) Oral two times a day      FAMILY HISTORY:  FH: lung cancer (Mother)          Allergies    No Known Drug Allergies  iv contrast (Hives)    Intolerances    	      PHYSICAL EXAM:  T(C): 36.6 (04-30-23 @ 17:35), Max: 36.8 (04-29-23 @ 22:40)  HR: 72 (04-30-23 @ 17:35) (58 - 76)  BP: 136/76 (04-30-23 @ 17:35) (133/78 - 151/85)  RR: 17 (04-30-23 @ 17:35) (17 - 20)  SpO2: 100% (04-30-23 @ 17:35) (97% - 100%)  Wt(kg): --  I&O's Summary    30 Apr 2023 07:01  -  30 Apr 2023 17:46  --------------------------------------------------------  IN: 0 mL / OUT: 150 mL / NET: -150 mL        GENERAL: NAD  EYES: EOMI, PERRLA, conjunctiva and sclera clear  ENMT: No tonsillar erythema, exudates, or enlargement; Moist mucous membranes, Good dentition, No lesions  Cardiovascular: Normal S1 S2, No JVD, No murmurs, No edema  Respiratory: Lungs clear to auscultation	  Gastrointestinal:  Soft, Non-tender, + BS	  Extremities: Normal edema      LABS:	 	    CARDIAC MARKERS:                                  14.0   4.58  )-----------( 207      ( 29 Apr 2023 17:45 )             40.4     04-29    125<L>  |  90<L>  |  12  ----------------------------<  105<H>  4.3   |  22  |  0.81    Ca    8.8      29 Apr 2023 17:45    TPro  6.3  /  Alb  4.3  /  TBili  0.3  /  DBili  x   /  AST  17  /  ALT  17  /  AlkPhos  67  04-29    proBNP:   Lipid Profile:   HgA1c:   TSH: Thyroid Stimulating Hormone, Serum: 3.54 uIU/mL (04-30 @ 06:43)      Consultant(s) Notes Reviewed:  [x ] YES  [ ] NO    Care Discussed with Consultants/Other Providers [ x] YES  [ ] NO    Imaging Personally Reviewed independently:  [x] YES  [ ] NO    All labs, radiologic studies, vitals, orders and medications list reviewed. Patient is seen and examined at bedside. Case discussed with medical team.    ASSESSMENT/PLAN:

## 2023-05-01 ENCOUNTER — TRANSCRIPTION ENCOUNTER (OUTPATIENT)
Age: 73
End: 2023-05-01

## 2023-05-01 VITALS
SYSTOLIC BLOOD PRESSURE: 154 MMHG | TEMPERATURE: 98 F | HEART RATE: 80 BPM | OXYGEN SATURATION: 100 % | DIASTOLIC BLOOD PRESSURE: 88 MMHG | RESPIRATION RATE: 18 BRPM

## 2023-05-01 LAB
ANION GAP SERPL CALC-SCNC: 13 MMOL/L — SIGNIFICANT CHANGE UP (ref 7–14)
ANION GAP SERPL CALC-SCNC: 14 MMOL/L — SIGNIFICANT CHANGE UP (ref 7–14)
BUN SERPL-MCNC: 6 MG/DL — LOW (ref 7–23)
CALCIUM SERPL-MCNC: 9 MG/DL — SIGNIFICANT CHANGE UP (ref 8.4–10.5)
CHLORIDE SERPL-SCNC: 88 MMOL/L — LOW (ref 98–107)
CHLORIDE SERPL-SCNC: 93 MMOL/L — LOW (ref 98–107)
CO2 SERPL-SCNC: 23 MMOL/L — SIGNIFICANT CHANGE UP (ref 22–31)
CREAT SERPL-MCNC: 0.71 MG/DL — SIGNIFICANT CHANGE UP (ref 0.5–1.3)
CREAT SERPL-MCNC: 0.75 MG/DL — SIGNIFICANT CHANGE UP (ref 0.5–1.3)
EGFR: 95 ML/MIN/1.73M2 — SIGNIFICANT CHANGE UP
EGFR: 97 ML/MIN/1.73M2 — SIGNIFICANT CHANGE UP
GLUCOSE SERPL-MCNC: 102 MG/DL — HIGH (ref 70–99)
GLUCOSE SERPL-MCNC: 99 MG/DL — SIGNIFICANT CHANGE UP (ref 70–99)
HCT VFR BLD CALC: 41.7 % — SIGNIFICANT CHANGE UP (ref 39–50)
HGB BLD-MCNC: 14.6 G/DL — SIGNIFICANT CHANGE UP (ref 13–17)
MAGNESIUM SERPL-MCNC: 2.4 MG/DL — SIGNIFICANT CHANGE UP (ref 1.6–2.6)
MCHC RBC-ENTMCNC: 31.6 PG — SIGNIFICANT CHANGE UP (ref 27–34)
MCHC RBC-ENTMCNC: 35 GM/DL — SIGNIFICANT CHANGE UP (ref 32–36)
MCV RBC AUTO: 90.3 FL — SIGNIFICANT CHANGE UP (ref 80–100)
NRBC # BLD: 0 /100 WBCS — SIGNIFICANT CHANGE UP (ref 0–0)
NRBC # FLD: 0 K/UL — SIGNIFICANT CHANGE UP (ref 0–0)
PHOSPHATE SERPL-MCNC: 3.3 MG/DL — SIGNIFICANT CHANGE UP (ref 2.5–4.5)
PLATELET # BLD AUTO: 206 K/UL — SIGNIFICANT CHANGE UP (ref 150–400)
POTASSIUM SERPL-MCNC: 3.5 MMOL/L — SIGNIFICANT CHANGE UP (ref 3.5–5.3)
POTASSIUM SERPL-MCNC: 4 MMOL/L — SIGNIFICANT CHANGE UP (ref 3.5–5.3)
POTASSIUM SERPL-SCNC: 3.5 MMOL/L — SIGNIFICANT CHANGE UP (ref 3.5–5.3)
POTASSIUM SERPL-SCNC: 4 MMOL/L — SIGNIFICANT CHANGE UP (ref 3.5–5.3)
RBC # BLD: 4.62 M/UL — SIGNIFICANT CHANGE UP (ref 4.2–5.8)
RBC # FLD: 11.3 % — SIGNIFICANT CHANGE UP (ref 10.3–14.5)
SODIUM SERPL-SCNC: 124 MMOL/L — LOW (ref 135–145)
SODIUM SERPL-SCNC: 130 MMOL/L — LOW (ref 135–145)
TSH SERPL-MCNC: 3.24 UIU/ML — SIGNIFICANT CHANGE UP (ref 0.27–4.2)
WBC # BLD: 4.14 K/UL — SIGNIFICANT CHANGE UP (ref 3.8–10.5)
WBC # FLD AUTO: 4.14 K/UL — SIGNIFICANT CHANGE UP (ref 3.8–10.5)

## 2023-05-01 PROCEDURE — 93306 TTE W/DOPPLER COMPLETE: CPT | Mod: 26

## 2023-05-01 PROCEDURE — 93016 CV STRESS TEST SUPVJ ONLY: CPT | Mod: GC

## 2023-05-01 PROCEDURE — 93018 CV STRESS TEST I&R ONLY: CPT | Mod: GC

## 2023-05-01 PROCEDURE — 78452 HT MUSCLE IMAGE SPECT MULT: CPT | Mod: 26

## 2023-05-01 RX ORDER — ACETAMINOPHEN 500 MG
2 TABLET ORAL
Qty: 0 | Refills: 0 | DISCHARGE
Start: 2023-05-01

## 2023-05-01 RX ORDER — CITALOPRAM 10 MG/1
1 TABLET, FILM COATED ORAL
Refills: 0 | DISCHARGE

## 2023-05-01 RX ORDER — ATORVASTATIN CALCIUM 80 MG/1
1 TABLET, FILM COATED ORAL
Qty: 30 | Refills: 0
Start: 2023-05-01 | End: 2023-05-30

## 2023-05-01 RX ORDER — CITALOPRAM 10 MG/1
1 TABLET, FILM COATED ORAL
Qty: 0 | Refills: 0 | DISCHARGE
Start: 2023-05-01

## 2023-05-01 RX ORDER — SODIUM CHLORIDE 9 MG/ML
1 INJECTION INTRAMUSCULAR; INTRAVENOUS; SUBCUTANEOUS
Qty: 60 | Refills: 0
Start: 2023-05-01

## 2023-05-01 RX ORDER — SODIUM CHLORIDE 9 MG/ML
1 INJECTION INTRAMUSCULAR; INTRAVENOUS; SUBCUTANEOUS
Qty: 0 | Refills: 0 | DISCHARGE
Start: 2023-05-01

## 2023-05-01 RX ORDER — SODIUM CHLORIDE 9 MG/ML
1 INJECTION INTRAMUSCULAR; INTRAVENOUS; SUBCUTANEOUS
Qty: 30 | Refills: 0
Start: 2023-05-01 | End: 2023-05-15

## 2023-05-01 RX ORDER — LANOLIN ALCOHOL/MO/W.PET/CERES
1 CREAM (GRAM) TOPICAL
Qty: 0 | Refills: 0 | DISCHARGE
Start: 2023-05-01

## 2023-05-01 RX ADMIN — POLYETHYLENE GLYCOL 3350 17 GRAM(S): 17 POWDER, FOR SOLUTION ORAL at 11:17

## 2023-05-01 RX ADMIN — SODIUM CHLORIDE 1 GRAM(S): 9 INJECTION INTRAMUSCULAR; INTRAVENOUS; SUBCUTANEOUS at 06:13

## 2023-05-01 RX ADMIN — SODIUM CHLORIDE 1 GRAM(S): 9 INJECTION INTRAMUSCULAR; INTRAVENOUS; SUBCUTANEOUS at 17:43

## 2023-05-01 NOTE — PROGRESS NOTE ADULT - PROBLEM SELECTOR PLAN 1
Exertional and non-exertional self-resolving non-reproducible CP;   MARGARITA score =2, 8% risk   HEART score =5, moderate   -Telemetry   -EKG: biphasic Tw in anterior leads   - Echo   - Stress test per card

## 2023-05-01 NOTE — PROGRESS NOTE ADULT - SUBJECTIVE AND OBJECTIVE BOX
[facility]   MD JOVANNI BRASWELL MD KRISTINE SOLTANPOUR, DO ANGELA WONG, PA    TEL:  OFFICE: [phone]  From 5pm-7am Answering Service [phone]    -- RENAL FOLLOW UP NOTE ---Date of Service 05-01-23 @ 13:01    Patient is a 72y old  Male who presents with a chief complaint of Chest pain (01 May 2023 11:35)      Patient seen and examined at bedside. No chest pain/sob. c/o constipation     VITALS:  T(F): 97.7 (05-01-23 @ 11:43), Max: 98 (04-30-23 @ 22:03)  HR: 74 (05-01-23 @ 04:00)  BP: 151/90 (05-01-23 @ 11:43)  RR: 18 (05-01-23 @ 11:43)  SpO2: 100% (05-01-23 @ 11:43)  Wt(kg): --    04-30 @ 07:01  -  05-01 @ 07:00  --------------------------------------------------------  IN: 0 mL / OUT: 150 mL / NET: -150 mL          PHYSICAL EXAM:  General: NAD  Neck: No JVD  Respiratory: CTAB, no wheezes, rales or rhonchi  Cardiovascular: S1, S2, RRR  Gastrointestinal: BS+, soft, NT/ND  Extremities: No peripheral edema    Hospital Medications:   MEDICATIONS  (STANDING):  atorvastatin 20 milliGRAM(s) Oral at bedtime  enoxaparin Injectable 40 milliGRAM(s) SubCutaneous every 24 hours  mirtazapine 7.5 milliGRAM(s) Oral at bedtime  polyethylene glycol 3350 17 Gram(s) Oral daily  sodium chloride 1 Gram(s) Oral two times a day      LABS:  05-01    130<L>  |  93<L>  |  6<L>  ----------------------------<  99  4.0   |  23  |  0.75    Ca    9.0      01 May 2023 07:22  Phos  3.3     05-01  Mg     2.40     05-01    TPro  6.3  /  Alb  4.3  /  TBili  0.3  /  DBili      /  AST  17  /  ALT  17  /  AlkPhos  67  04-29    Creatinine Trend: 0.75 <--, 0.71 <--, 0.81 <--    Phosphorus Level, Serum: 3.3 mg/dL (05-01 @ 07:22)  Phosphorus Level, Serum: 3.9 mg/dL (04-30 @ 22:40)                              14.6   4.14  )-----------( 206      ( 01 May 2023 07:22 )             41.7     Urine Studies:    Urine Sodium 42      [04-30-23 @ 06:00]  Urine Potassium 36.9      [04-30-23 @ 06:00]  Urine Chloride 31      [04-30-23 @ 06:00]  Urine Osmolality 466      [04-30-23 @ 06:00]    TSH 3.24      [05-01-23 @ 07:22]  Lipid: chol 213, TG 63, HDL 92, LDL --      [04-30-23 @ 06:43]    HCV 0.10, Nonreact      [04-30-23 @ 06:43]      RADIOLOGY & ADDITIONAL STUDIES:  
Stanislav Lyles MD  Interventional Cardiology / Advance Heart Failure and Cardiac Transplant Specialist  [place] Office : [address] [place] [address]  Tel:   [place] Office : [address] [facility] [address]  Tel: [phone]      Subjective/Overnight events: Pt is lying in bed not in distress, no chest pains no SOB no palpitations  	  MEDICATIONS:  enoxaparin Injectable 40 milliGRAM(s) SubCutaneous every 24 hours        acetaminophen     Tablet .. 650 milliGRAM(s) Oral every 6 hours PRN  melatonin 3 milliGRAM(s) Oral at bedtime PRN  mirtazapine 7.5 milliGRAM(s) Oral at bedtime  ondansetron Injectable 4 milliGRAM(s) IV Push every 8 hours PRN    polyethylene glycol 3350 17 Gram(s) Oral daily    atorvastatin 20 milliGRAM(s) Oral at bedtime    sodium chloride 1 Gram(s) Oral two times a day      PAST MEDICAL/SURGICAL HISTORY  PAST MEDICAL & SURGICAL HISTORY:  HTN (hypertension)      Depression      Prostate cancer  1997- prostatectomy      S/P prostatectomy  1997          SOCIAL HISTORY: Substance Use (street drugs): ( x ) never used  (  ) other:    FAMILY HISTORY:  FH: lung cancer (Mother)            PHYSICAL EXAM:  T(C): 36.6 (05-01-23 @ 04:00), Max: 36.7 (04-30-23 @ 22:03)  HR: 74 (05-01-23 @ 04:00) (62 - 74)  BP: 125/88 (05-01-23 @ 04:00) (125/88 - 138/81)  RR: 18 (05-01-23 @ 04:00) (17 - 18)  SpO2: 98% (05-01-23 @ 04:00) (98% - 100%)  Wt(kg): --  I&O's Summary    30 Apr 2023 07:01  -  01 May 2023 07:00  --------------------------------------------------------  IN: 0 mL / OUT: 150 mL / NET: -150 mL          GENERAL: NAD  EYES: EOMI, PERRLA, conjunctiva and sclera clear  ENMT: No tonsillar erythema, exudates, or enlargement  Cardiovascular: Normal S1 S2, No JVD, No murmurs, No edema  Respiratory: Lungs clear to auscultation	  Gastrointestinal:  Soft, Non-tender, + BS	  Extremities: No edema                                     14.6   4.14  )-----------( 206      ( 01 May 2023 07:22 )             41.7     05-01    130<L>  |  93<L>  |  6<L>  ----------------------------<  99  4.0   |  23  |  0.75    Ca    9.0      01 May 2023 07:22  Phos  3.3     05-01  Mg     2.40     05-01    TPro  6.3  /  Alb  4.3  /  TBili  0.3  /  DBili  x   /  AST  17  /  ALT  17  /  AlkPhos  67  04-29    proBNP:   Lipid Profile:   HgA1c:   TSH: Thyroid Stimulating Hormone, Serum: 3.24 uIU/mL (05-01 @ 07:22)      Consultant(s) Notes Reviewed:  [x ] YES  [ ] NO    Care Discussed with Consultants/Other Providers [ x] YES  [ ] NO    Imaging Personally Reviewed independently:  [x] YES  [ ] NO    All labs, radiologic studies, vitals, orders and medications list reviewed. Patient is seen and examined at bedside. Case discussed with medical team.        
Name of Patient : RON DUMONT  MRN: [id number]  Date of visit: 05-01-23 @ 14:12      Subjective: Patient seen and examined. No new events except as noted.     REVIEW OF SYSTEMS:    CONSTITUTIONAL: No weakness, fevers or chills  EYES/ENT: No visual changes;  No vertigo or throat pain   NECK: No pain or stiffness  RESPIRATORY: No cough, wheezing, hemoptysis; No shortness of breath  CARDIOVASCULAR: No chest pain or palpitations  GASTROINTESTINAL: No abdominal or epigastric pain. No nausea, vomiting, or hematemesis; No diarrhea or constipation. No melena or hematochezia.  GENITOURINARY: No dysuria, frequency or hematuria  NEUROLOGICAL: No numbness or weakness  SKIN: No itching, burning, rashes, or lesions   All other review of systems is negative unless indicated above.    MEDICATIONS:  MEDICATIONS  (STANDING):  atorvastatin 20 milliGRAM(s) Oral at bedtime  enoxaparin Injectable 40 milliGRAM(s) SubCutaneous every 24 hours  mirtazapine 7.5 milliGRAM(s) Oral at bedtime  polyethylene glycol 3350 17 Gram(s) Oral daily  sodium chloride 1 Gram(s) Oral two times a day      PHYSICAL EXAM:  T(C): 36.5 (05-01-23 @ 11:43), Max: 36.7 (04-30-23 @ 22:03)  HR: 74 (05-01-23 @ 04:00) (62 - 74)  BP: 151/90 (05-01-23 @ 11:43) (125/88 - 151/90)  RR: 18 (05-01-23 @ 11:43) (17 - 18)  SpO2: 100% (05-01-23 @ 11:43) (98% - 100%)  Wt(kg): --  I&O's Summary    30 Apr 2023 07:01  -  01 May 2023 07:00  --------------------------------------------------------  IN: 0 mL / OUT: 150 mL / NET: -150 mL          Appearance: Normal	  HEENT:  PERRLA   Lymphatic: No lymphadenopathy   Cardiovascular: Normal S1 S2, no JVD  Respiratory: normal effort , clear  Gastrointestinal:  Soft, Non-tender  Skin: No rashes,  warm to touch  Psychiatry:  Mood & affect appropriate  Musculuskeletal: No edema    recent labs, Imaging and EKGs personally reviewed     04-30-23 @ 07:01  -  05-01-23 @ 07:00  --------------------------------------------------------  IN: 0 mL / OUT: 150 mL / NET: -150 mL                          14.6   4.14  )-----------( 206      ( 01 May 2023 07:22 )             41.7               05-01    130<L>  |  93<L>  |  6<L>  ----------------------------<  99  4.0   |  23  |  0.75    Ca    9.0      01 May 2023 07:22  Phos  3.3     05-01  Mg     2.40     05-01    TPro  6.3  /  Alb  4.3  /  TBili  0.3  /  DBili  x   /  AST  17  /  ALT  17  /  AlkPhos  67  04-29

## 2023-05-01 NOTE — DISCHARGE NOTE PROVIDER - PROVIDER TOKENS
PROVIDER:[TOKEN:[[id number]],ESTABLISHEDPATIENT:[T]] PROVIDER:[TOKEN:[[id number]],ESTABLISHEDPATIENT:[T]],PROVIDER:[TOKEN:[[id number]]] PROVIDER:[TOKEN:[[id number]],ESTABLISHEDPATIENT:[T]],PROVIDER:[TOKEN:[[id number]]],PROVIDER:[TOKEN:[[id number]],FOLLOWUP:[1 week]]

## 2023-05-01 NOTE — DISCHARGE NOTE PROVIDER - NSDCCPCAREPLAN_GEN_ALL_CORE_FT
PRINCIPAL DISCHARGE DIAGNOSIS  Diagnosis: Chest pain  Assessment and Plan of Treatment: You were evaluated for chest pain and had a normal echocardiogram and a stress test without significant abnormalities requiring interventional cardiology.  Anti-cholesterol medication was started since your cholesterol levels were mildly elevated.  Follow up with your primary care physician for further monitoring in 1-2 weeks. Please call to arrange appointment. It is advised that you limit your sodium intake and follow a low fat, heart-healthy diet.     PRINCIPAL DISCHARGE DIAGNOSIS  Diagnosis: Chest pain  Assessment and Plan of Treatment: You were evaluated for chest pain and had a normal echocardiogram and a stress test without significant abnormalities requiring interventional cardiology.  Anti-cholesterol medication was started since your cholesterol levels were mildly elevated.  Follow up with your primary care physician for further monitoring in 1-2 weeks. Please call to arrange appointment. It is advised that you limit your sodium intake and follow a low fat, heart-healthy diet.      SECONDARY DISCHARGE DIAGNOSES  Diagnosis: Hyponatremia  Assessment and Plan of Treatment: Your Sodium level was 125 on admission; No pain or nausea; Possible side-effect of SSRI (Celexa), medication was held in the hospital  Pro-BNP wnl, TSH- 3.54, CXR showed clear lungs   - can resume taking Celexa at home but follow up with your PCP within a week to monitor sodium level and to speak with your PCP regarding stopping Celexa. Started Sodium tablets 2xdaily for now    Diagnosis: Other depression  Assessment and Plan of Treatment: Can resume taking Celexa at home but follow up with your PCP within a week to monitor sodium level and to speak with your PCP regarding stopping Celexa. Started Sodium tablets 2xdaily for now     PRINCIPAL DISCHARGE DIAGNOSIS  Diagnosis: Chest pain  Assessment and Plan of Treatment: You were evaluated for chest pain and had a normal echocardiogram and a stress test without significant abnormalities requiring interventional cardiology.  Anti-cholesterol medication was started since your cholesterol levels were mildly elevated.  Follow up with your primary care physician for further monitoring in 1-2 weeks. Please call to arrange appointment. It is advised that you limit your sodium intake and follow a low fat, heart-healthy diet.      SECONDARY DISCHARGE DIAGNOSES  Diagnosis: Hyponatremia  Assessment and Plan of Treatment: Your Sodium level was 125 on admission; No pain or nausea; Possible side-effect of SSRI (Celexa), medication was held in the hospital  Pro-BNP wnl, TSH- 3.54, CXR showed clear lungs   - can resume taking Celexa at home but follow up with your PCP within a week to monitor sodium level and to speak with your PCP regarding stopping Celexa. Started Sodium tablets 2xdaily for now  - Please follow-up with the nephrologist (kidney doctor) in 1 week.    Diagnosis: Other depression  Assessment and Plan of Treatment: Can resume taking Celexa at home but follow up with your PCP within a week to monitor sodium level and to speak with your PCP regarding stopping Celexa. Started Sodium tablets 2xdaily for now

## 2023-05-01 NOTE — PROGRESS NOTE ADULT - ASSESSMENT
73yo male, non-smoker, history of depression, hyperlipidemia a/w hyponatremia and angina;       Hyponatremia Euvolemic  urine work up suggested SIADH  TSH wnl  check cortisol level in the am  Na improving  off celex   on na tab  improving  monitor    HTN  Optimal  Continue to monitor. 
71yo male, non-smoker, history of depression, hyperlipidemia; c/o sudden onset substernal nonradiating pressure-like chest pain    EKG SR no ischemic changes       1) CP  - EKG ok  - trop -enrico  -TTE normal LV function, no WMA  -stress pending    2) SOB  -Euvolemic on exam  -TTE normal LV function, no WMA    3) Hyponatremia  -renal on board f/u recs    4) DVT PPX  - lovenox   
71yo male, non-smoker, history of depression, hyperlipidemia a/w hyponatremia and angina;

## 2023-05-01 NOTE — DISCHARGE NOTE NURSING/CASE MANAGEMENT/SOCIAL WORK - PATIENT PORTAL LINK FT
You can access the FollowMyHealth Patient Portal offered by [facility] by registering at the following website: [email or web address]. By joining [facility]’s FollowMyHealth portal, you will also be able to view your health information using other applications (apps) compatible with our system.

## 2023-05-01 NOTE — DISCHARGE NOTE PROVIDER - HOSPITAL COURSE
71yo male, non-smoker, history of depression, hyperlipidemia a/w hyponatremia and angina;       Angina pectoris.   Exertional and non-exertional self-resolving non-reproducible CP;   MARGARITA score =2, 8% risk   HEART score =5, moderate   -Telemetry   -EKG: biphasic Tw in anterior leads   - Echo   - Stress test per cardiology - normal, no gross abnormalities revealed    Hyponatremia.   · Sodium 125; No pain or nausea; Possible side-effect of SSRI:  Exam not c/w fluid overload, Pro-BNP wnl   -TSH  -CXR: clear lungs   -Hold Celexa   -Monitor sodium daily  -Hold all iv fluids.    Other depression.   ·  c/w Remeron   Hold Celexa as above  TFTs.      On 5/1/23 case was discussed with Dr. Mcmanus and Dr. Lyles, patient is medically cleared and optimized for discharge today. All medications were reviewed with attending, and sent to mutually agreed upon pharmacy   73yo male, non-smoker, history of depression, hyperlipidemia a/w hyponatremia and angina;     Angina pectoris- Exertional and non-exertional self-resolving non-reproducible CP;   MARGARITA score =2, 8% risk HEART score =5, moderate   -Telemetry, EKG: biphasic Tw in anterior leads   - Echo - normal  - Stress test per cardiology - normal, no gross abnormalities revealed    Hyponatremia- Sodium 125; No pain or nausea; Possible side-effect of SSRI:  - Exam not c/w fluid overload, Pro-BNP wnl, TSH- 3.54  - CXR- clear lungs   - Held Celexa, started Na tabs   - Monitor sodium daily, Hold all iv fluids.    Other depression - c/w Remeron, Held Celexa in the hospital, resumed on d/c per Attending recs  TFTs.      On 5/1/23 case was discussed with Dr. Mcmanus and Dr. Lyles, patient is medically cleared and optimized for discharge today. All medications were reviewed with attending, and sent to mutually agreed upon pharmacy

## 2023-05-01 NOTE — DISCHARGE NOTE PROVIDER - CARE PROVIDERS DIRECT ADDRESSES
,DirectAddress_Unknown ,DirectAddress_Unknown,DirectAddress_Unknown ,DirectAddress_Unknown,DirectAddress_Unknown,[email or web address]

## 2023-05-01 NOTE — PROGRESS NOTE ADULT - PROBLEM SELECTOR PLAN 2
Sodium 125; No pain or nausea; Possible side-effect of SSRI:  Exam not c/w fluid overload, Pro-BNP wnl   -TSH  -CXR: clear lungs   -Hold Celexa   -Monitor sodium daily  -Hold all iv fluids

## 2023-05-01 NOTE — CHART NOTE - NSCHARTNOTEFT_GEN_A_CORE
ACP NIGHT MEDICINE COVERAGE    D/w medical attending, Dr. Mcmanus, pt medically cleared for discharge.  Will be sent home on [employer] Tabs.  Pt to follow-up w/ nephrology in 1 week.    Andrew Olivas PA-C  Department of Medicine - ACP  In-House Pager: #[phone]

## 2023-05-01 NOTE — DISCHARGE NOTE PROVIDER - NSDCMRMEDTOKEN_GEN_ALL_CORE_FT
acetaminophen 325 mg oral tablet: 2 tab(s) orally every 6 hours As needed Temp greater or equal to 38C (100.4F), Mild Pain (1 - 3)  atorvastatin 20 mg oral tablet: 1 tab(s) orally once a day (at bedtime)  citalopram 10 mg oral tablet: 1 orally once a day  melatonin 3 mg oral tablet: 1 tab(s) orally once a day (at bedtime) As needed Insomnia  MiraLax oral powder for reconstitution: 17 orally once a day  mirtazapine 7.5 mg oral tablet: 1 orally once a day (at bedtime)   acetaminophen 325 mg oral tablet: 2 tab(s) orally every 6 hours As needed Temp greater or equal to 38C (100.4F), Mild Pain (1 - 3)  atorvastatin 20 mg oral tablet: 1 tab(s) orally once a day (at bedtime)  citalopram 10 mg oral tablet: 1 tab(s) orally once a day  melatonin 3 mg oral tablet: 1 tab(s) orally once a day (at bedtime) As needed Insomnia  MiraLax oral powder for reconstitution: 17 orally once a day  mirtazapine 7.5 mg oral tablet: 1 orally once a day (at bedtime)   acetaminophen 325 mg oral tablet: 2 tab(s) orally every 6 hours As needed Temp greater or equal to 38C (100.4F), Mild Pain (1 - 3)  atorvastatin 20 mg oral tablet: 1 tab(s) orally once a day (at bedtime)  citalopram 10 mg oral tablet: 1 tab(s) orally once a day  melatonin 3 mg oral tablet: 1 tab(s) orally once a day (at bedtime) As needed Insomnia  MiraLax oral powder for reconstitution: 17 orally once a day  mirtazapine 7.5 mg oral tablet: 1 orally once a day (at bedtime)  sodium chloride 1 g oral tablet: 1 tab(s) orally 2 times a day

## 2023-05-01 NOTE — DISCHARGE NOTE NURSING/CASE MANAGEMENT/SOCIAL WORK - NSDCPEFALRISK_GEN_ALL_CORE
For information on Fall & Injury Prevention, visit: [email or web address] OR  [email or web address] OR  https://www.cdc.gov/steadi/patient.html

## 2023-05-01 NOTE — DISCHARGE NOTE PROVIDER - CARE PROVIDER_API CALL
Stanislav Lyles (MD)  Cardiovascular Disease; Internal Medicine  [address] [place] [address]  Phone: [phone]  Fax: [phone]  Established Patient  Follow Up Time:    Stanislav Lyles (MD)  Cardiovascular Disease; Internal Medicine  [address] [place] [address]  Phone: [phone]  Fax: [phone]  Established Patient  Follow Up Time:     Nicholas Bolivar  GASTROENTEROLOGY  [address] [place] [address], [address]  [place], [place] [id number]  Phone: [phone]  Fax: [phone]  Follow Up Time:    Stanislav Lyles (MD)  Cardiovascular Disease; Internal Medicine  [address] [place] [address]  Phone: [phone]  Fax: [phone]  Established Patient  Follow Up Time:     Nicholas Bolivar  GASTROENTEROLOGY  [address] [place] [address], [address]  [place], [place] [address]  Phone: [phone]  Fax: [phone]  Follow Up Time:     Reyna Morales (DO)  Internal Medicine  [address] [place] [address]  Phone: [phone]  Fax: [phone]  Follow Up Time: 1 week

## 2023-07-10 ENCOUNTER — EMERGENCY (EMERGENCY)
Facility: HOSPITAL | Age: 73
LOS: 1 days | Discharge: HOME | End: 2023-07-10
Admitting: STUDENT IN AN ORGANIZED HEALTH CARE EDUCATION/TRAINING PROGRAM
Payer: MEDICARE

## 2023-07-10 VITALS
SYSTOLIC BLOOD PRESSURE: 129 MMHG | RESPIRATION RATE: 16 BRPM | HEART RATE: 74 BPM | DIASTOLIC BLOOD PRESSURE: 78 MMHG | TEMPERATURE: 98 F | OXYGEN SATURATION: 96 %

## 2023-07-10 PROCEDURE — 99284 EMERGENCY DEPT VISIT MOD MDM: CPT

## 2023-07-10 RX ORDER — TETANUS TOXOID, REDUCED DIPHTHERIA TOXOID AND ACELLULAR PERTUSSIS VACCINE, ADSORBED 5; 2.5; 8; 8; 2.5 [IU]/.5ML; [IU]/.5ML; UG/.5ML; UG/.5ML; UG/.5ML
0.5 SUSPENSION INTRAMUSCULAR ONCE
Refills: 0 | Status: COMPLETED | OUTPATIENT
Start: 2023-07-10 | End: 2023-07-10

## 2023-07-10 RX ADMIN — TETANUS TOXOID, REDUCED DIPHTHERIA TOXOID AND ACELLULAR PERTUSSIS VACCINE, ADSORBED 0.5 MILLILITER(S): 5; 2.5; 8; 8; 2.5 SUSPENSION INTRAMUSCULAR at 09:43

## 2023-07-10 NOTE — ED PROVIDER NOTE - NSFOLLOWUPINSTRUCTIONS_ED_ALL_ED_FT
Apply the silver sulfadiazine   Keep the area clean and dry at all times.   Return to Emergency room with any worsening symptoms or no improvements
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
## 2023-07-10 NOTE — ED ADULT NURSE NOTE - NSFALLUNIVINTERV_ED_ALL_ED
Bed/Stretcher in lowest position, wheels locked, appropriate side rails in place/Call bell, personal items and telephone in reach/Instruct patient to call for assistance before getting out of bed/chair/stretcher/Non-slip footwear applied when patient is off stretcher/Gates to call system/Physically safe environment - no spills, clutter or unnecessary equipment/Purposeful proactive rounding/Room/bathroom lighting operational, light cord in reach
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
## 2023-07-10 NOTE — ED PROVIDER NOTE - PATIENT PORTAL LINK FT
You can access the FollowMyHealth Patient Portal offered by [facility] by registering at the following website: [email or web address]. By joining [facility] [employer]’s FollowMyHealth portal, you will also be able to view your health information using other applications (apps) compatible with our system.

## 2023-07-11 NOTE — ED POST DISCHARGE NOTE - RESULT SUMMARY
RIGO thomas: Patient called asking for Silvadene to be sent to his pharmacy, was told to continue the cream however noticed his is .  Rx sent to patient's pharmacy.

## 2023-07-20 NOTE — DISCHARGE NOTE NURSING/CASE MANAGEMENT/SOCIAL WORK - NSPROEXTENSIONSOFSELF_GEN_A_NUR
none Pinch Graft Text: The defect edges were debeveled with a #15 scalpel blade. Given the location of the defect, shape of the defect and the proximity to free margins a pinch graft was deemed most appropriate. Using a sterile surgical marker, the primary defect shape was transferred to the donor site. The area thus outlined was incised deep to adipose tissue with a #15 scalpel blade.  The harvested graft was then trimmed of adipose tissue until only dermis and epidermis was left. The skin margins of the secondary defect were undermined to an appropriate distance in all directions utilizing iris scissors.  The secondary defect was closed with interrupted buried subcutaneous sutures.  The skin edges were then re-apposed with running  sutures.  The skin graft was then placed in the primary defect and oriented appropriately.

## 2023-10-12 ENCOUNTER — OFFICE (OUTPATIENT)
Dept: URBAN - METROPOLITAN AREA CLINIC 90 | Facility: CLINIC | Age: 73
Setting detail: OPHTHALMOLOGY
End: 2023-10-12
Payer: MEDICARE

## 2023-10-12 DIAGNOSIS — H10.9: ICD-10-CM

## 2023-10-12 PROCEDURE — 92012 INTRM OPH EXAM EST PATIENT: CPT | Performed by: OPHTHALMOLOGY

## 2023-10-12 ASSESSMENT — REFRACTION_AUTOREFRACTION
OD_CYLINDER: -2.00
OD_SPHERE: --1.75
OS_AXIS: 087
OD_AXIS: 083
OS_CYLINDER: -1.25
OS_SPHERE: -2.00

## 2023-10-12 ASSESSMENT — REFRACTION_MANIFEST
OD_ADD: +3.50
OS_VA1: 20/25-
OS_ADD: +3.00
OS_VA1: 20/30+1
OD_CYLINDER: +2.00
OS_AXIS: 010
OD_CYLINDER: -1.75
OD_SPHERE: -1.75
OS_VA2: 20/30
OS_CYLINDER: -1.25
OD_VA2: 20/25(J1)
OS_SPHERE: -3.00
OU_VA: 20/20
OS_AXIS: 095
OS_VA2: 20/25(J1)
OD_ADD: +3.00
OD_AXIS: 175
OS_SPHERE: -2.25
OD_SPHERE: -1.75
OS_CYLINDER: -1.25
OD_VA2: 20/30
OD_SPHERE: -3.50
OS_ADD: +3.50
OS_ADD: +3.00
OD_AXIS: 090
OS_SPHERE: -2.25
OS_AXIS: 100
OS_VA1: 20/25-
OD_ADD: +3.00
OD_CYLINDER: -1.75
OD_VA1: 20/30
OS_CYLINDER: +0.75
OD_VA1: 20/40
OD_VA1: 20/25
OD_AXIS: 085

## 2023-10-12 ASSESSMENT — REFRACTION_CURRENTRX
OD_ADD: +2.75
OD_OVR_VA: 20/
OD_SPHERE: -1.50
OS_ADD: +2.25
OD_CYLINDER: +1.75
OD_VPRISM_DIRECTION: PROGS
OS_VPRISM_DIRECTION: PROGS
OS_VPRISM_DIRECTION: PROGS
OS_AXIS: 010
OS_OVR_VA: 20/
OS_ADD: +2.75
OD_CYLINDER: -1.75
OS_OVR_VA: 20/
OD_ADD: +2.50
OD_AXIS: 081
OS_CYLINDER: -1.50
OS_SPHERE: -2.00
OD_OVR_VA: 20/
OS_OVR_VA: 20/
OS_VPRISM_DIRECTION: PROGS
OS_ADD: +2.50
OS_SPHERE: -3.00
OS_AXIS: 096
OD_CYLINDER: -2.00
OS_AXIS: 103
OS_CYLINDER: +1.00
OS_CYLINDER: -1.25
OD_SPHERE: -0.75
OS_SPHERE: -2.00
OD_ADD: +2.25
OD_OVR_VA: 20/
OD_VPRISM_DIRECTION: PROGS
OD_VPRISM_DIRECTION: PROGS
OD_AXIS: 172
OD_AXIS: 070
OD_SPHERE: -3.25

## 2023-10-12 ASSESSMENT — SPHEQUIV_DERIVED
OS_SPHEQUIV: -2.875
OD_SPHEQUIV: -2.625
OS_SPHEQUIV: -2.625
OD_SPHEQUIV: -2.5
OS_SPHEQUIV: -2.875
OD_SPHEQUIV: -2.625
OS_SPHEQUIV: -2.625

## 2023-10-12 ASSESSMENT — LID EXAM ASSESSMENTS: OS_COMMENTS: LID EVERSION NO FOREIGN BODY OR REACTION UL COMMENTS

## 2023-10-12 ASSESSMENT — AXIALLENGTH_DERIVED
OS_AL: 23.8423
OS_AL: 23.7432
OS_AL: 23.8423
OD_AL: 23.6506
OD_AL: 23.6017
OD_AL: 23.6506
OS_AL: 23.7432

## 2023-10-12 ASSESSMENT — CONFRONTATIONAL VISUAL FIELD TEST (CVF)
OS_FINDINGS: FULL
OD_FINDINGS: FULL

## 2023-10-12 ASSESSMENT — KERATOMETRY
OS_AXISANGLE_DEGREES: 100
OD_K1POWER_DIOPTERS: 46.00
METHOD_AUTO_MANUAL: AUTO
OS_K2POWER_DIOPTERS: 46.00
OS_K1POWER_DIOPTERS: 45.75
OD_K2POWER_DIOPTERS: 46.25
OD_AXISANGLE_DEGREES: 155

## 2023-10-12 ASSESSMENT — LID POSITION - DERMATOCHALASIS
OS_DERMATOCHALASIS: LUL 2+
OD_DERMATOCHALASIS: RUL 2+

## 2023-10-12 ASSESSMENT — VISUAL ACUITY
OS_BCVA: 20/40-2
OD_BCVA: 20/50

## 2023-10-12 ASSESSMENT — TONOMETRY
OD_IOP_MMHG: 12
OS_IOP_MMHG: 12

## 2024-07-16 ENCOUNTER — TRANSCRIPTION ENCOUNTER (OUTPATIENT)
Age: 74
End: 2024-07-16

## 2024-07-25 ENCOUNTER — APPOINTMENT (OUTPATIENT)
Dept: PULMONOLOGY | Facility: CLINIC | Age: 74
End: 2024-07-25

## 2024-08-19 ENCOUNTER — APPOINTMENT (OUTPATIENT)
Dept: ORTHOPEDIC SURGERY | Facility: CLINIC | Age: 74
End: 2024-08-19

## 2024-08-27 ENCOUNTER — APPOINTMENT (OUTPATIENT)
Dept: PULMONOLOGY | Facility: CLINIC | Age: 74
End: 2024-08-27
Payer: MEDICARE

## 2024-08-27 VITALS
WEIGHT: 127 LBS | SYSTOLIC BLOOD PRESSURE: 123 MMHG | OXYGEN SATURATION: 97 % | DIASTOLIC BLOOD PRESSURE: 74 MMHG | HEIGHT: 64 IN | HEART RATE: 64 BPM | BODY MASS INDEX: 21.68 KG/M2

## 2024-08-27 DIAGNOSIS — G47.33 OBSTRUCTIVE SLEEP APNEA (ADULT) (PEDIATRIC): ICD-10-CM

## 2024-08-27 PROCEDURE — 99203 OFFICE O/P NEW LOW 30 MIN: CPT

## 2024-08-27 NOTE — PROCEDURE
[FreeTextEntry1] : Sleep study 2008 Split-night study moderate MAMIE effectively treated with CPAP 8 cm

## 2024-08-27 NOTE — ASSESSMENT
[FreeTextEntry1] : History of obstructive sleep apnea using CPAP machine that is well beyond functional lifespan.  Should have device replacement should have updated home sleep study advised patient to hold CPAP for 2 nights prior to diagnostic study and then do a 2 night home sleep study he will then do a follow-up appointment with me to review.  I have asked him to bring in his existing equipment so I can check his compliance and efficacy if available prior to represcribing auto CPAP

## 2024-08-27 NOTE — HISTORY OF PRESENT ILLNESS
[TextBox_4] : Here for sleep apnea reevaluate Last evaluated over 15 years ago At that time found to have moderate sleep apnea and treated with CPAP No follow-up since 2009. Says he is using his machine on a nightly basis. However he needs new equipment

## 2024-09-26 ENCOUNTER — OFFICE (OUTPATIENT)
Age: 74
Setting detail: OPHTHALMOLOGY
End: 2024-09-26
Payer: MEDICARE

## 2024-09-26 DIAGNOSIS — H35.372: ICD-10-CM

## 2024-09-26 DIAGNOSIS — H02.831: ICD-10-CM

## 2024-09-26 DIAGNOSIS — H02.834: ICD-10-CM

## 2024-09-26 DIAGNOSIS — H52.7: ICD-10-CM

## 2024-09-26 DIAGNOSIS — H35.361: ICD-10-CM

## 2024-09-26 DIAGNOSIS — H25.13: ICD-10-CM

## 2024-09-26 DIAGNOSIS — H11.153: ICD-10-CM

## 2024-09-26 PROCEDURE — 92014 COMPRE OPH EXAM EST PT 1/>: CPT | Performed by: OPHTHALMOLOGY

## 2024-09-26 ASSESSMENT — LID EXAM ASSESSMENTS: OS_COMMENTS: LID EVERSION NO FOREIGN BODY OR REACTION UL COMMENTS

## 2024-09-26 ASSESSMENT — LID POSITION - DERMATOCHALASIS
OD_DERMATOCHALASIS: RUL 2+
OS_DERMATOCHALASIS: LUL 2+

## 2024-10-07 ENCOUNTER — APPOINTMENT (OUTPATIENT)
Dept: PULMONOLOGY | Facility: CLINIC | Age: 74
End: 2024-10-07
Payer: MEDICARE

## 2024-10-07 PROCEDURE — 95800 SLP STDY UNATTENDED: CPT

## 2024-10-08 ENCOUNTER — APPOINTMENT (OUTPATIENT)
Dept: ORTHOPEDIC SURGERY | Facility: CLINIC | Age: 74
End: 2024-10-08
Payer: MEDICARE

## 2024-10-08 VITALS — HEIGHT: 64 IN | WEIGHT: 127 LBS | BODY MASS INDEX: 21.68 KG/M2

## 2024-10-08 DIAGNOSIS — M54.16 RADICULOPATHY, LUMBAR REGION: ICD-10-CM

## 2024-10-08 DIAGNOSIS — Z98.890 OTHER SPECIFIED POSTPROCEDURAL STATES: ICD-10-CM

## 2024-10-08 PROCEDURE — 95800 SLP STDY UNATTENDED: CPT

## 2024-10-08 PROCEDURE — 72100 X-RAY EXAM L-S SPINE 2/3 VWS: CPT

## 2024-10-08 PROCEDURE — 99203 OFFICE O/P NEW LOW 30 MIN: CPT

## 2024-10-08 RX ORDER — LORATADINE 10 MG
17 TABLET,DISINTEGRATING ORAL
Refills: 0 | Status: ACTIVE | COMMUNITY

## 2024-10-08 RX ORDER — METHYLPREDNISOLONE 4 MG/1
4 TABLET ORAL
Qty: 1 | Refills: 1 | Status: ACTIVE | COMMUNITY
Start: 2024-10-08 | End: 1900-01-01

## 2024-10-25 ENCOUNTER — APPOINTMENT (OUTPATIENT)
Dept: ORTHOPEDIC SURGERY | Facility: CLINIC | Age: 74
End: 2024-10-25

## 2024-10-28 ENCOUNTER — APPOINTMENT (OUTPATIENT)
Dept: PULMONOLOGY | Facility: CLINIC | Age: 74
End: 2024-10-28
Payer: MEDICARE

## 2024-10-28 VITALS — OXYGEN SATURATION: 97 % | HEART RATE: 54 BPM | SYSTOLIC BLOOD PRESSURE: 126 MMHG | DIASTOLIC BLOOD PRESSURE: 75 MMHG

## 2024-10-28 DIAGNOSIS — G47.33 OBSTRUCTIVE SLEEP APNEA (ADULT) (PEDIATRIC): ICD-10-CM

## 2024-10-28 PROCEDURE — G2211 COMPLEX E/M VISIT ADD ON: CPT

## 2024-10-28 PROCEDURE — 99213 OFFICE O/P EST LOW 20 MIN: CPT

## 2024-11-19 ENCOUNTER — APPOINTMENT (OUTPATIENT)
Dept: ORTHOPEDIC SURGERY | Facility: CLINIC | Age: 74
End: 2024-11-19
Payer: MEDICARE

## 2024-11-19 PROCEDURE — 99213 OFFICE O/P EST LOW 20 MIN: CPT

## 2024-11-29 ENCOUNTER — APPOINTMENT (OUTPATIENT)
Dept: ORTHOPEDIC SURGERY | Facility: CLINIC | Age: 74
End: 2024-11-29
Payer: MEDICARE

## 2024-11-29 DIAGNOSIS — M24.159 OTHER ARTICULAR CARTILAGE DISORDERS, UNSPECIFIED HIP: ICD-10-CM

## 2024-11-29 DIAGNOSIS — Z98.890 OTHER SPECIFIED POSTPROCEDURAL STATES: ICD-10-CM

## 2024-11-29 DIAGNOSIS — M16.11 UNILATERAL PRIMARY OSTEOARTHRITIS, RIGHT HIP: ICD-10-CM

## 2024-11-29 DIAGNOSIS — D36.10 BENIGN NEOPLASM OF PERIPHERAL NERVES AND AUTONOMIC NERVOUS SYSTEM, UNSPECIFIED: ICD-10-CM

## 2024-11-29 DIAGNOSIS — M54.16 RADICULOPATHY, LUMBAR REGION: ICD-10-CM

## 2024-11-29 DIAGNOSIS — M48.061 SPINAL STENOSIS, LUMBAR REGION WITHOUT NEUROGENIC CLAUDICATION: ICD-10-CM

## 2024-11-29 PROCEDURE — 99214 OFFICE O/P EST MOD 30 MIN: CPT

## 2024-11-29 RX ORDER — MELOXICAM 15 MG/1
15 TABLET ORAL DAILY
Qty: 30 | Refills: 5 | Status: ACTIVE | COMMUNITY
Start: 2024-11-29 | End: 1900-01-01

## 2024-12-04 ENCOUNTER — APPOINTMENT (OUTPATIENT)
Dept: ORTHOPEDIC SURGERY | Facility: CLINIC | Age: 74
End: 2024-12-04

## 2024-12-05 ENCOUNTER — APPOINTMENT (OUTPATIENT)
Dept: ORTHOPEDIC SURGERY | Facility: CLINIC | Age: 74
End: 2024-12-05

## 2024-12-09 ENCOUNTER — APPOINTMENT (OUTPATIENT)
Dept: PULMONOLOGY | Facility: CLINIC | Age: 74
End: 2024-12-09
Payer: MEDICARE

## 2024-12-09 VITALS
HEART RATE: 64 BPM | SYSTOLIC BLOOD PRESSURE: 161 MMHG | RESPIRATION RATE: 16 BRPM | OXYGEN SATURATION: 97 % | DIASTOLIC BLOOD PRESSURE: 80 MMHG

## 2024-12-09 DIAGNOSIS — G47.33 OBSTRUCTIVE SLEEP APNEA (ADULT) (PEDIATRIC): ICD-10-CM

## 2024-12-09 PROCEDURE — 99213 OFFICE O/P EST LOW 20 MIN: CPT

## 2024-12-09 PROCEDURE — G2211 COMPLEX E/M VISIT ADD ON: CPT

## 2025-01-27 ENCOUNTER — APPOINTMENT (OUTPATIENT)
Dept: ORTHOPEDIC SURGERY | Facility: CLINIC | Age: 75
End: 2025-01-27
Payer: MEDICARE

## 2025-01-27 DIAGNOSIS — M47.812 SPONDYLOSIS W/OUT MYELOPATHY OR RADICULOPATHY, CERVICAL REGION: ICD-10-CM

## 2025-01-27 DIAGNOSIS — M54.2 CERVICALGIA: ICD-10-CM

## 2025-01-27 DIAGNOSIS — M54.12 RADICULOPATHY, CERVICAL REGION: ICD-10-CM

## 2025-01-27 PROCEDURE — 99204 OFFICE O/P NEW MOD 45 MIN: CPT

## 2025-01-27 RX ORDER — METHYLPREDNISOLONE 4 MG/1
4 TABLET ORAL
Qty: 1 | Refills: 0 | Status: ACTIVE | COMMUNITY
Start: 2025-01-27 | End: 1900-01-01

## 2025-03-06 ENCOUNTER — APPOINTMENT (OUTPATIENT)
Dept: ORTHOPEDIC SURGERY | Facility: CLINIC | Age: 75
End: 2025-03-06
Payer: MEDICARE

## 2025-03-06 DIAGNOSIS — M54.16 RADICULOPATHY, LUMBAR REGION: ICD-10-CM

## 2025-03-06 DIAGNOSIS — M16.11 UNILATERAL PRIMARY OSTEOARTHRITIS, RIGHT HIP: ICD-10-CM

## 2025-03-06 DIAGNOSIS — M24.159 OTHER ARTICULAR CARTILAGE DISORDERS, UNSPECIFIED HIP: ICD-10-CM

## 2025-03-06 DIAGNOSIS — M48.061 SPINAL STENOSIS, LUMBAR REGION WITHOUT NEUROGENIC CLAUDICATION: ICD-10-CM

## 2025-03-06 PROCEDURE — 99214 OFFICE O/P EST MOD 30 MIN: CPT

## 2025-03-06 RX ORDER — METHYLPREDNISOLONE 4 MG/1
4 TABLET ORAL
Qty: 1 | Refills: 1 | Status: ACTIVE | COMMUNITY
Start: 2025-03-06 | End: 1900-01-01

## 2025-05-01 ENCOUNTER — EMERGENCY (EMERGENCY)
Facility: HOSPITAL | Age: 75
LOS: 1 days | End: 2025-05-01
Attending: EMERGENCY MEDICINE
Payer: COMMERCIAL

## 2025-05-01 VITALS
WEIGHT: 121.03 LBS | DIASTOLIC BLOOD PRESSURE: 91 MMHG | HEIGHT: 64 IN | TEMPERATURE: 98 F | OXYGEN SATURATION: 98 % | HEART RATE: 93 BPM | RESPIRATION RATE: 17 BRPM | SYSTOLIC BLOOD PRESSURE: 145 MMHG

## 2025-05-01 PROCEDURE — 71046 X-RAY EXAM CHEST 2 VIEWS: CPT | Mod: 26

## 2025-05-01 PROCEDURE — 99283 EMERGENCY DEPT VISIT LOW MDM: CPT | Mod: 25

## 2025-05-01 PROCEDURE — 99284 EMERGENCY DEPT VISIT MOD MDM: CPT

## 2025-05-01 PROCEDURE — 71046 X-RAY EXAM CHEST 2 VIEWS: CPT

## 2025-05-01 RX ORDER — ACETAMINOPHEN 500 MG/5ML
975 LIQUID (ML) ORAL ONCE
Refills: 0 | Status: COMPLETED | OUTPATIENT
Start: 2025-05-01 | End: 2025-05-01

## 2025-05-01 RX ORDER — LIDOCAINE HYDROCHLORIDE 20 MG/ML
1 JELLY TOPICAL ONCE
Refills: 0 | Status: COMPLETED | OUTPATIENT
Start: 2025-05-01 | End: 2025-05-01

## 2025-05-01 RX ADMIN — LIDOCAINE HYDROCHLORIDE 1 PATCH: 20 JELLY TOPICAL at 17:39

## 2025-05-01 RX ADMIN — Medication 975 MILLIGRAM(S): at 17:39
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
## 2025-05-01 NOTE — ED PROVIDER NOTE - PATIENT PORTAL LINK FT
You can access the FollowMyHealth Patient Portal offered by [facility] by registering at the following website: [email or web address]. By joining [employer]’s FollowMyHealth portal, you will also be able to view your health information using other applications (apps) compatible with our system.
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
## 2025-05-01 NOTE — ED PROVIDER NOTE - PHYSICAL EXAMINATION
GEN: Pt in NAD, A&O x3. GCS 15.   EYES: No periorbital ecchymosis, sclera white w/o injection PERRL, EOMI.  HENT: Head NCAT. Nares without deformity, no DC. No auricular tenderness, no ear DC. no Phan's sign. Neck supple FROM. No midline or paracervical tenderness. Trachea midline.   RESP: No chest wall tenderness, CTA b/l, no wheezes, rales, or rhonchi.   CARDIAC: RRR clear distinct S1, S2, no murmurs.   ABDOMEN: Abdomen without any obvious deformities, no signs of trauma or bruising. Abdomen soft, non-tender. No CVAT b/l.   VASC: 2+ radial and dorsalis pedis pulses b/l.   MSK: No joint erythema or obvious deformities. Spine without obvious deformity. No midline spinal tenderness or step-offs. ttp paraspinal musculature in the thoracic region. Pelvis stable. No bony tenderness. FROM w/o pain of UE and LE b/l.   NEURO: CN2-12 intact. Normal and equal sensation UE, LE and face b/l. 5/5 strength upper and lower extremity b/l. Pronator drift negative. Normal gait and gross cerebellar functioning.   SKIN: No rashes noted.

## 2025-05-01 NOTE — ED PROVIDER NOTE - NSFOLLOWUPINSTRUCTIONS_ED_ALL_ED_FT
See your Primary Doctor next week for follow up -- call to discuss.    Rest, limit strenuous activity until better, ice area as directed, continue current medications / treatments.    See [employer] information and return instructions given to you.    Seek immediate medical care for new/worsening symptoms/concerns.

## 2025-06-23 ENCOUNTER — OUTPATIENT (OUTPATIENT)
Dept: OUTPATIENT SERVICES | Facility: HOSPITAL | Age: 75
LOS: 1 days | Discharge: ANOTHER HOSPITAL | End: 2025-06-23